# Patient Record
Sex: MALE | Race: WHITE | Employment: FULL TIME | URBAN - METROPOLITAN AREA
[De-identification: names, ages, dates, MRNs, and addresses within clinical notes are randomized per-mention and may not be internally consistent; named-entity substitution may affect disease eponyms.]

---

## 2020-11-13 ENCOUNTER — HOSPITAL ENCOUNTER (OUTPATIENT)
Age: 35
Discharge: HOME | End: 2020-11-13
Payer: COMMERCIAL

## 2020-11-13 VITALS
OXYGEN SATURATION: 97 % | TEMPERATURE: 100 F | HEART RATE: 74 BPM | SYSTOLIC BLOOD PRESSURE: 168 MMHG | RESPIRATION RATE: 18 BRPM | DIASTOLIC BLOOD PRESSURE: 98 MMHG

## 2020-11-13 DIAGNOSIS — Z20.822 SUSPECTED COVID-19 VIRUS INFECTION: Primary | ICD-10-CM

## 2020-11-13 PROCEDURE — 99203 OFFICE O/P NEW LOW 30 MIN: CPT

## 2020-11-13 NOTE — ED PROVIDER NOTES
Patient Seen in: [facility]      History   Patient presents with:  Cough/URI    Stated Complaint: cough    HPI    25-year-old male here for evaluation of URI symptoms, cough.   Patient reports he has been experiencing a dry cough, low-grade fe present. Mental Status: He is alert and oriented to person, place, and time.    Psychiatric:         Mood and Affect: Mood normal.         Behavior: Behavior normal.              ED Course     Labs Reviewed   SARS-COV-2 RNA,QUAL RT-PCR ([employer])

## 2020-11-13 NOTE — ED INITIAL ASSESSMENT (HPI)
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 PATIENT WITH + COUGH.  + LOW GRADE FEVER.

## 2021-01-08 ENCOUNTER — OFFICE VISIT (OUTPATIENT)
Dept: INTERNAL MEDICINE CLINIC | Facility: CLINIC | Age: 36
End: 2021-01-08
Payer: COMMERCIAL

## 2021-01-08 VITALS
TEMPERATURE: 98 F | SYSTOLIC BLOOD PRESSURE: 162 MMHG | BODY MASS INDEX: 34.51 KG/M2 | HEART RATE: 86 BPM | DIASTOLIC BLOOD PRESSURE: 101 MMHG | HEIGHT: 69 IN | WEIGHT: 233 LBS

## 2021-01-08 DIAGNOSIS — Z00.00 ANNUAL PHYSICAL EXAM: Primary | ICD-10-CM

## 2021-01-08 DIAGNOSIS — Z23 NEED FOR 23-POLYVALENT PNEUMOCOCCAL POLYSACCHARIDE VACCINE: ICD-10-CM

## 2021-01-08 DIAGNOSIS — I10 HYPERTENSION GOAL BP (BLOOD PRESSURE) < 130/80: ICD-10-CM

## 2021-01-08 DIAGNOSIS — F17.200 SMOKING: ICD-10-CM

## 2021-01-08 PROBLEM — IMO0001 SMOKING: Status: ACTIVE | Noted: 2021-01-08

## 2021-01-08 PROCEDURE — 99385 PREV VISIT NEW AGE 18-39: CPT | Performed by: INTERNAL MEDICINE

## 2021-01-08 PROCEDURE — 3008F BODY MASS INDEX DOCD: CPT | Performed by: INTERNAL MEDICINE

## 2021-01-08 PROCEDURE — 3080F DIAST BP >= 90 MM HG: CPT | Performed by: INTERNAL MEDICINE

## 2021-01-08 PROCEDURE — 90732 PPSV23 VACC 2 YRS+ SUBQ/IM: CPT | Performed by: INTERNAL MEDICINE

## 2021-01-08 PROCEDURE — 3077F SYST BP >= 140 MM HG: CPT | Performed by: INTERNAL MEDICINE

## 2021-01-08 PROCEDURE — 90471 IMMUNIZATION ADMIN: CPT | Performed by: INTERNAL MEDICINE

## 2021-01-08 RX ORDER — AMLODIPINE BESYLATE 5 MG/1
5 TABLET ORAL NIGHTLY
Qty: 90 TABLET | Refills: 1 | Status: SHIPPED | OUTPATIENT
Start: 2021-01-08 | End: 2021-04-02

## 2021-01-08 NOTE — PROGRESS NOTES
History of Present Illness   Patient ID: Stella Winter III is a 38year old male.   Chief Complaint: Physical (new pcp)      Stella Winter III is a pleasant 38year old male who presents for annual physical exam. Stella Winter Abdominal: Soft. Normal appearance and bowel sounds are normal. There is no abdominal tenderness. Musculoskeletal: Normal range of motion. Neurological: He is alert and oriented to person, place, and time. He has normal strength.    Skin: Skin is warm and - TSH W REFLEX TO FREE T4; Future  - COMP METABOLIC PANEL (14); Future  - HEMOGLOBIN A1C; Future  - LIPID PANEL; Future  - MICROALB/CREAT RATIO, RANDOM URINE; Future  - EKG 12-LEAD; Future  Plan  Doing well today. Screen labs above.   Encouraged increased Patient understands and agrees to follow directions and advice. ----------------------------------------- PATIENT INSTRUCTIONS-----------------------------------------     Patient Instructions       1.  Please purchase a blood pressure monitor, if you High blood pressure can harm your health  In a healthy blood vessel, the blood moves smoothly through the vessel and puts normal pressure on the vessel walls. High blood pressure occurs when blood pushes too hard against artery walls.  This causes dam An example of a blood pressure measurement is 120/70 (120 over 70). The top number is the pressure of blood against the artery walls during a heartbeat (systolic).  The bottom number is the pressure of blood against artery walls between heartbeats (diastoli · Limit alcohol. Drinking too much alcohol can raise blood pressure. Men should have no more than 2 drinks a day. Women should have no more than 1. (A drink is equal to 1 beer, or a small glass of wine, or a shot of liquor.)  · Control stress.  Stress makes Blood pressure measurements are given as 2 numbers. Systolic blood pressure is the upper number. This is the pressure when the heart contracts. Diastolic blood pressure is the lower number. This is the pressure when the heart relaxes between beats.  You sol · Start an exercise program. Talk with your healthcare provider about what exercise program is best for you. It doesn’t have to be difficult. Even brisk walking for 20 minutes 3 times a week is a good form of exercise.   · Avoid medicines that stimulates th · Relax for 5 minutes before taking the measurement. · Sit correctly. Be sure your back is supported. Don't sit on a couch or soft chair. Uncross your feet and place them flat on the floor.  Place your arm on a solid, flat surface like a table with the upp · Dizziness or dizziness with spinning sensation (vertigo)  · Weakness in an arm or leg or on one side of the face  · Trouble speaking or seeing   Controlling High Blood Pressure  High blood pressure (hypertension) is often called the silent killer.  This i · When you go to a restaurant, ask that your meal be prepared with no added salt. Maintain a healthy weight  · Ask your healthcare provider how many calories to eat a day. Then stick to that number.   · Ask your healthcare provider what weight range is h Eating salt (sodium) can make your body retain too much water. Excess water makes your heart work harder. Canned, packaged, and frozen foods are easy to prepare. But they are often high in sodium.  Here are some ideas for low-salt foods you can easily make ? Take 1-2 tablets of naproxen, which is the same as Aleve, twice daily with food for 5-7 days to reduce inflammation and pain. Do not use these medications if you have a kidney condition.  You may take an over the counter proton-pump-inhibitor such as Nexi · It can spread or radiate upwards, to the front, or go down your arms or legs (sciatica). · It can cause muscle spasm. Most of the time, mechanical problems with the muscles or spine cause the pain.  Mechanical problems are usually caused by an injury to · Don't sit for long periods, as in a long car ride or during other travel. This puts more stress on the lower back than standing or walking.   · During the first 24 to 72 hours after an acute injury or flare up of chronic back pain, apply an ice pack to th A radiologist will review any X-rays that were taken. Your provide will notify you of any new findings that may affect your care.   Call 911  Call 911 if any of the following occur:  · Trouble breathing  · Confusion  · Very drowsy or trouble awakening  · Fa · Over-the-counter medicine can help control pain and swelling. Try aspirin or a non-steroidal anti-inflammatory medicine (NSAID) such as ibuprofen.   Exercise  Exercise can help your back heal. It also helps your back get stronger and more flexible, preven · Wrap an ice pack or a bag of frozen peas in a thin towel. Never put ice directly on your skin. · Place the ice where your back hurts the most.  · Don’t ice for more than 20 minutes at a time. · You can use ice several times a day.   Medicines  Over-the- You don't always need X-rays for the first assessment of back pain, unless you had a physical injury such as from a car accident or fall. If your pain continues and doesn't respond to medical treatment, X-rays and other tests may then be done.    Home care · Relax and repeat the exercise with your right knee. · Do 2 to 3 of these exercises for each leg. · Repeat, hugging both knees to your chest at the same time. · Don't bounce, but use a gentle pull.   Medicines  Talk with your doctor before using medicin © 0465-3909 [employer] [address]. [address] [facility] [place] [facility] [place]. All rights reserved. This information is not intended as a substitute for professional medical care. Always follow your healthcare professional's instructions.

## 2021-01-08 NOTE — PATIENT INSTRUCTIONS
1. Please purchase a blood pressure monitor, if you don't already own one, and record your blood pressure and heart rate 1-2 times daily on the provided log.  The more values you provide at the end of the month the easier it will be to adjust your medic High blood pressure occurs when blood pushes too hard against artery walls. This causes damage to the artery walls and then the formation of scar tissue as it heals. This makes the arteries stiff and weak.  Plaque sticks to the scarred tissue narrowing and An example of a blood pressure measurement is 120/70 (120 over 70). The top number is the pressure of blood against the artery walls during a heartbeat (systolic).  The bottom number is the pressure of blood against artery walls between heartbeats (diastoli · Limit alcohol. Drinking too much alcohol can raise blood pressure. Men should have no more than 2 drinks a day. Women should have no more than 1. (A drink is equal to 1 beer, or a small glass of wine, or a shot of liquor.)  · Control stress.  Stress makes Blood pressure measurements are given as 2 numbers. Systolic blood pressure is the upper number. This is the pressure when the heart contracts. Diastolic blood pressure is the lower number. This is the pressure when the heart relaxes between beats.  You sol · Start an exercise program. Talk with your healthcare provider about what exercise program is best for you. It doesn’t have to be difficult. Even brisk walking for 20 minutes 3 times a week is a good form of exercise.   · Avoid medicines that stimulates th · Relax for 5 minutes before taking the measurement. · Sit correctly. Be sure your back is supported. Don't sit on a couch or soft chair. Uncross your feet and place them flat on the floor.  Place your arm on a solid, flat surface like a table with the upp · Dizziness or dizziness with spinning sensation (vertigo)  · Weakness in an arm or leg or on one side of the face  · Trouble speaking or seeing   Controlling High Blood Pressure  High blood pressure (hypertension) is often called the silent killer.  This i · When you go to a restaurant, ask that your meal be prepared with no added salt. Maintain a healthy weight  · Ask your healthcare provider how many calories to eat a day. Then stick to that number.   · Ask your healthcare provider what weight range is h Eating salt (sodium) can make your body retain too much water. Excess water makes your heart work harder. Canned, packaged, and frozen foods are easy to prepare. But they are often high in sodium.  Here are some ideas for low-salt foods you can easily make ? Take 1-2 tablets of naproxen, which is the same as Aleve, twice daily with food for 5-7 days to reduce inflammation and pain. Do not use these medications if you have a kidney condition.  You may take an over the counter proton-pump-inhibitor such as Nexi · It can spread or radiate upwards, to the front, or go down your arms or legs (sciatica). · It can cause muscle spasm. Most of the time, mechanical problems with the muscles or spine cause the pain.  Mechanical problems are usually caused by an injury to · Don't sit for long periods, as in a long car ride or during other travel. This puts more stress on the lower back than standing or walking.   · During the first 24 to 72 hours after an acute injury or flare up of chronic back pain, apply an ice pack to th A radiologist will review any X-rays that were taken. Your provide will notify you of any new findings that may affect your care.   Call 911  Call 911 if any of the following occur:  · Trouble breathing  · Confusion  · Very drowsy or trouble awakening  · Fa · Over-the-counter medicine can help control pain and swelling. Try aspirin or a non-steroidal anti-inflammatory medicine (NSAID) such as ibuprofen.   Exercise  Exercise can help your back heal. It also helps your back get stronger and more flexible, preven · Wrap an ice pack or a bag of frozen peas in a thin towel. Never put ice directly on your skin. · Place the ice where your back hurts the most.  · Don’t ice for more than 20 minutes at a time. · You can use ice several times a day.   Medicines  Over-the- You don't always need X-rays for the first assessment of back pain, unless you had a physical injury such as from a car accident or fall. If your pain continues and doesn't respond to medical treatment, X-rays and other tests may then be done.    Home care · Relax and repeat the exercise with your right knee. · Do 2 to 3 of these exercises for each leg. · Repeat, hugging both knees to your chest at the same time. · Don't bounce, but use a gentle pull.   Medicines  Talk with your doctor before using medicin © 3173-4770 [address]. [address] [facility] [place], [facility] [place]. All rights reserved. This information is not intended as a substitute for professional medical care. Always follow your healthcare professional's instructions.

## 2021-01-11 ENCOUNTER — EKG ENCOUNTER (OUTPATIENT)
Dept: LAB | Age: 36
End: 2021-01-11
Attending: INTERNAL MEDICINE
Payer: COMMERCIAL

## 2021-01-11 ENCOUNTER — LAB ENCOUNTER (OUTPATIENT)
Dept: LAB | Age: 36
End: 2021-01-11
Attending: INTERNAL MEDICINE
Payer: COMMERCIAL

## 2021-01-11 DIAGNOSIS — I10 HYPERTENSION GOAL BP (BLOOD PRESSURE) < 130/80: ICD-10-CM

## 2021-01-11 DIAGNOSIS — Z00.00 ANNUAL PHYSICAL EXAM: ICD-10-CM

## 2021-01-11 LAB
ALBUMIN SERPL-MCNC: 3.9 G/DL (ref 3.4–5)
ALBUMIN/GLOB SERPL: 1.1 {RATIO} (ref 1–2)
ALP LIVER SERPL-CCNC: 81 U/L
ALT SERPL-CCNC: 41 U/L
ANION GAP SERPL CALC-SCNC: 2 MMOL/L (ref 0–18)
AST SERPL-CCNC: 23 U/L (ref 15–37)
BILIRUB SERPL-MCNC: 0.5 MG/DL (ref 0.1–2)
BUN BLD-MCNC: 13 MG/DL (ref 7–18)
BUN/CREAT SERPL: 15.1 (ref 10–20)
CALCIUM BLD-MCNC: 9.5 MG/DL (ref 8.5–10.1)
CHLORIDE SERPL-SCNC: 107 MMOL/L (ref 98–112)
CHOLEST SMN-MCNC: 193 MG/DL (ref ?–200)
CO2 SERPL-SCNC: 29 MMOL/L (ref 21–32)
CREAT BLD-MCNC: 0.86 MG/DL
CREAT UR-SCNC: 245 MG/DL
DEPRECATED RDW RBC AUTO: 41.9 FL (ref 35.1–46.3)
ERYTHROCYTE [DISTWIDTH] IN BLOOD BY AUTOMATED COUNT: 12 % (ref 11–15)
EST. AVERAGE GLUCOSE BLD GHB EST-MCNC: 103 MG/DL (ref 68–126)
GLOBULIN PLAS-MCNC: 3.5 G/DL (ref 2.8–4.4)
GLUCOSE BLD-MCNC: 97 MG/DL (ref 70–99)
HBA1C MFR BLD HPLC: 5.2 % (ref ?–5.7)
HCT VFR BLD AUTO: 47.1 %
HDLC SERPL-MCNC: 51 MG/DL (ref 40–59)
HGB BLD-MCNC: 15.4 G/DL
LDLC SERPL CALC-MCNC: 111 MG/DL (ref ?–100)
M PROTEIN MFR SERPL ELPH: 7.4 G/DL (ref 6.4–8.2)
MCH RBC QN AUTO: 31 PG (ref 26–34)
MCHC RBC AUTO-ENTMCNC: 32.7 G/DL (ref 31–37)
MCV RBC AUTO: 95 FL
MICROALBUMIN UR-MCNC: 0.86 MG/DL
MICROALBUMIN/CREAT 24H UR-RTO: 3.5 UG/MG (ref ?–30)
NONHDLC SERPL-MCNC: 142 MG/DL (ref ?–130)
OSMOLALITY SERPL CALC.SUM OF ELEC: 286 MOSM/KG (ref 275–295)
PATIENT FASTING Y/N/NP: YES
PATIENT FASTING Y/N/NP: YES
PLATELET # BLD AUTO: 323 10(3)UL (ref 150–450)
POTASSIUM SERPL-SCNC: 4.2 MMOL/L (ref 3.5–5.1)
RBC # BLD AUTO: 4.96 X10(6)UL
SODIUM SERPL-SCNC: 138 MMOL/L (ref 136–145)
TRIGL SERPL-MCNC: 153 MG/DL (ref 30–149)
TSI SER-ACNC: 1.33 MIU/ML (ref 0.36–3.74)
VLDLC SERPL CALC-MCNC: 31 MG/DL (ref 0–30)
WBC # BLD AUTO: 7.9 X10(3) UL (ref 4–11)

## 2021-01-11 PROCEDURE — 80061 LIPID PANEL: CPT

## 2021-01-11 PROCEDURE — 82570 ASSAY OF URINE CREATININE: CPT

## 2021-01-11 PROCEDURE — 80053 COMPREHEN METABOLIC PANEL: CPT

## 2021-01-11 PROCEDURE — 83036 HEMOGLOBIN GLYCOSYLATED A1C: CPT

## 2021-01-11 PROCEDURE — 85027 COMPLETE CBC AUTOMATED: CPT

## 2021-01-11 PROCEDURE — 93005 ELECTROCARDIOGRAM TRACING: CPT

## 2021-01-11 PROCEDURE — 36415 COLL VENOUS BLD VENIPUNCTURE: CPT

## 2021-01-11 PROCEDURE — 82043 UR ALBUMIN QUANTITATIVE: CPT

## 2021-01-11 PROCEDURE — 84443 ASSAY THYROID STIM HORMONE: CPT

## 2021-01-11 PROCEDURE — 93010 ELECTROCARDIOGRAM REPORT: CPT | Performed by: INTERNAL MEDICINE

## 2021-01-27 ENCOUNTER — TELEMEDICINE (OUTPATIENT)
Dept: INTERNAL MEDICINE CLINIC | Facility: CLINIC | Age: 36
End: 2021-01-27
Payer: COMMERCIAL

## 2021-01-27 VITALS — HEART RATE: 101 BPM | DIASTOLIC BLOOD PRESSURE: 111 MMHG | SYSTOLIC BLOOD PRESSURE: 162 MMHG

## 2021-01-27 DIAGNOSIS — I10 HYPERTENSION GOAL BP (BLOOD PRESSURE) < 130/80: Primary | ICD-10-CM

## 2021-01-27 PROCEDURE — 3080F DIAST BP >= 90 MM HG: CPT | Performed by: INTERNAL MEDICINE

## 2021-01-27 PROCEDURE — 3077F SYST BP >= 140 MM HG: CPT | Performed by: INTERNAL MEDICINE

## 2021-01-27 PROCEDURE — 99214 OFFICE O/P EST MOD 30 MIN: CPT | Performed by: INTERNAL MEDICINE

## 2021-01-27 RX ORDER — TELMISARTAN 40 MG/1
40 TABLET ORAL NIGHTLY
Qty: 90 TABLET | Refills: 1 | Status: SHIPPED | OUTPATIENT
Start: 2021-01-27 | End: 2021-03-01

## 2021-01-27 NOTE — PROGRESS NOTES
Telehealth outside of [address] Verbal Consent     I conducted a telehealth visit with Alejandra Tay III today, 01/27/21, which was completed using two-way, real-time interactive audio and video communication.  This has been done in good This is a chronic problem. The current episode started 1 to 4 weeks ago. The problem has been waxing and waning since onset. The problem is uncontrolled. Pertinent negatives include no headaches, peripheral edema or shortness of breath.  Risk factors for co Uncontrolled, continue amlodipine 5mg, given BP>160/90 will start second agent per guideline with telmisartan 40mg and titrate to goal above. If not covered by insurance asked to check with pharmacy for alternatives. Monitor BP daily or ideally BID.  Strong Smoking       Reviewed Social History:  Social History    Tobacco Use      Smoking status: Current Every Day Smoker        Packs/day: 1.00        Years: 20.00        Pack years: 20      Smokeless tobacco: Never Used    Alcohol use: Never      Frequency:

## 2021-03-01 ENCOUNTER — TELEMEDICINE (OUTPATIENT)
Dept: INTERNAL MEDICINE CLINIC | Facility: CLINIC | Age: 36
End: 2021-03-01
Payer: COMMERCIAL

## 2021-03-01 VITALS — SYSTOLIC BLOOD PRESSURE: 145 MMHG | DIASTOLIC BLOOD PRESSURE: 93 MMHG

## 2021-03-01 DIAGNOSIS — I10 HYPERTENSION GOAL BP (BLOOD PRESSURE) < 130/80: Primary | ICD-10-CM

## 2021-03-01 PROCEDURE — 99214 OFFICE O/P EST MOD 30 MIN: CPT | Performed by: INTERNAL MEDICINE

## 2021-03-01 PROCEDURE — 3077F SYST BP >= 140 MM HG: CPT | Performed by: INTERNAL MEDICINE

## 2021-03-01 PROCEDURE — 3080F DIAST BP >= 90 MM HG: CPT | Performed by: INTERNAL MEDICINE

## 2021-03-01 RX ORDER — TELMISARTAN 80 MG/1
80 TABLET ORAL NIGHTLY
Qty: 90 TABLET | Refills: 1 | Status: SHIPPED | OUTPATIENT
Start: 2021-03-01 | End: 2021-05-07

## 2021-03-01 NOTE — PROGRESS NOTES
Telehealth outside of [address] Verbal Consent     I conducted a telehealth visit with Kayleigh Mccurdy III today, 03/01/21, which was completed using two-way, real-time interactive audio and video communication.  This has been done in good This is a chronic problem. The current episode started more than 1 month ago. The problem has been gradually improving since onset. The problem is uncontrolled. Pertinent negatives include no blurred vision, headaches, palpitations or peripheral edema.  Pas Follow Up: Return in about 1 month (around 4/2/2021) for VIRTUAL VISIT FOLLOW UP- htn- 220pm..       Labs & Imaging  Pertinent labs and imaging reviewed.    Lab Results   Component Value Date    GLU 97 01/11/2021    BUN 13 01/11/2021    BUNCREA 15.1 01/11/2 • telmisartan 80 MG Oral Tab Take 1 tablet (80 mg total) by mouth nightly. FOR BLOOD PRESSURE 90 tablet 1   • amLODIPine Besylate 5 MG Oral Tab Take 1 tablet (5 mg total) by mouth nightly. FOR BLOOD PRESSURE.  90 tablet 1            Patient advised to call The circulatory system is made up of the heart and blood vessels that carry blood through the body. Your heart is the pump for this system. With each heartbeat (contraction), the heart sends blood out through large blood vessels called arteries.  Blood pres · Stage 1 high blood pressure is systolic is 252 to 643 or diastolic between 80 to 89  · Stage 2 high blood pressure is when systolic is 757 or higher or the diastolic is 90 or higher  High blood pressure is diagnosed when multiple, separate readings show · Maintain a healthy weight. Being overweight makes you more likely to have high blood pressure. Losing excess weight helps lower blood pressure. · Exercise regularly. Daily exercise helps your heart and blood vessels work better and stay healthier.  It ca Your blood pressure can also rise if you are emotionally upset or in intense pain. It may go back to normal after a period of rest.  Blood pressure measurements are given as 2 numbers. Systolic blood pressure is the upper number.  This is the pressure when ? Use only small amounts of salt when cooking. · Start an exercise program. Talk with your healthcare provider about what exercise program is best for you. It doesn’t have to be difficult.  Even brisk walking for 20 minutes 3 times a week is a good form of · Don't smoke or drink coffee for 30 minutes  · Go to the bathroom before the test.  · Relax for 5 minutes before taking the measurement. · Sit correctly. Be sure your back is supported. Don't sit on a couch or soft chair.  Uncross your feet and place them · Throbbing or rushing sound in the ears  · Nosebleed  · Extreme drowsiness, confusion, or fainting  · Dizziness or dizziness with spinning sensation (vertigo)  · Weakness in an arm or leg or on one side of the face  · Trouble speaking or seeing   Controll · Ask your doctor about the DASH eating plan. This plan helps reduce blood pressure. · When you go to a restaurant, ask that your meal be prepared with no added salt.     Maintain a healthy weight  · Ask your healthcare provider how many calories to eat a Eating salt (sodium) can make your body retain too much water. Excess water makes your heart work harder. Canned, packaged, and frozen foods are easy to prepare. But they are often high in sodium.  Here are some ideas for low-salt foods you can easily make

## 2021-03-01 NOTE — PATIENT INSTRUCTIONS
1. Please purchase a blood pressure monitor, if you don't already own one, and record your blood pressure and heart rate 1-2 times daily on the provided log.  The more values you provide at the end of the month the easier it will be to adjust your medic High blood pressure occurs when blood pushes too hard against artery walls. This causes damage to the artery walls and then the formation of scar tissue as it heals. This makes the arteries stiff and weak.  Plaque sticks to the scarred tissue narrowing and An example of a blood pressure measurement is 120/70 (120 over 70). The top number is the pressure of blood against the artery walls during a heartbeat (systolic).  The bottom number is the pressure of blood against artery walls between heartbeats (diastoli · Limit alcohol. Drinking too much alcohol can raise blood pressure. Men should have no more than 2 drinks a day. Women should have no more than 1. (A drink is equal to 1 beer, or a small glass of wine, or a shot of liquor.)  · Control stress.  Stress makes Blood pressure measurements are given as 2 numbers. Systolic blood pressure is the upper number. This is the pressure when the heart contracts. Diastolic blood pressure is the lower number. This is the pressure when the heart relaxes between beats.  You sol · Start an exercise program. Talk with your healthcare provider about what exercise program is best for you. It doesn’t have to be difficult. Even brisk walking for 20 minutes 3 times a week is a good form of exercise.   · Avoid medicines that stimulates th · Relax for 5 minutes before taking the measurement. · Sit correctly. Be sure your back is supported. Don't sit on a couch or soft chair. Uncross your feet and place them flat on the floor.  Place your arm on a solid, flat surface like a table with the upp · Dizziness or dizziness with spinning sensation (vertigo)  · Weakness in an arm or leg or on one side of the face  · Trouble speaking or seeing   Controlling High Blood Pressure  High blood pressure (hypertension) is often called the silent killer.  This i · When you go to a restaurant, ask that your meal be prepared with no added salt. Maintain a healthy weight  · Ask your healthcare provider how many calories to eat a day. Then stick to that number.   · Ask your healthcare provider what weight range is h Eating salt (sodium) can make your body retain too much water. Excess water makes your heart work harder. Canned, packaged, and frozen foods are easy to prepare. But they are often high in sodium.  Here are some ideas for low-salt foods you can easily make

## 2021-04-02 NOTE — PROGRESS NOTES
Telehealth Visit        I spoke with Saima Tinoco III by secure video chat ([employer]), verified date of birth, and discussed their current concerns:     Reason for Visit:    HPI  Pleasant 78-year-old gentleman with a history of smokin alert and oriented to person, place, and time. Mental status is at baseline. Psychiatric:         Mood and Affect: Mood normal.         Thought Content:  Thought content normal.         Reviewed current medications:  Current Outpatient Medications   Medic symptoms worsen or Return in about 5 weeks (around 5/7/2021) for VIRTUAL VISIT FOLLOW UP 340pm anxity and htn. .. Labs & Imaging  Pertinent labs and imaging reviewed.    Lab Results   Component Value Date    GLU 97 01/11/2021    BUN 13 01/11/2021    BU advice. Telehealth Verbal Consent     I conducted a telehealth visit with Giovana Hargrove III today, 04/02/21, which was completed using two-way, real-time interactive audio and video communication.  This has been done in good michela to provide co

## 2021-04-07 DIAGNOSIS — F41.1 GENERALIZED ANXIETY DISORDER WITH PANIC ATTACKS: ICD-10-CM

## 2021-04-07 DIAGNOSIS — F41.0 GENERALIZED ANXIETY DISORDER WITH PANIC ATTACKS: ICD-10-CM

## 2021-04-09 RX ORDER — ESCITALOPRAM OXALATE 5 MG/1
TABLET ORAL
Qty: 90 TABLET | Refills: 0 | Status: SHIPPED | OUTPATIENT
Start: 2021-04-09 | End: 2021-05-07

## 2021-04-29 DIAGNOSIS — F41.0 GENERALIZED ANXIETY DISORDER WITH PANIC ATTACKS: ICD-10-CM

## 2021-04-29 DIAGNOSIS — F41.1 GENERALIZED ANXIETY DISORDER WITH PANIC ATTACKS: ICD-10-CM

## 2021-04-30 DIAGNOSIS — F41.1 GENERALIZED ANXIETY DISORDER WITH PANIC ATTACKS: ICD-10-CM

## 2021-04-30 DIAGNOSIS — F41.0 GENERALIZED ANXIETY DISORDER WITH PANIC ATTACKS: ICD-10-CM

## 2021-04-30 RX ORDER — ALPRAZOLAM 0.25 MG/1
0.25 TABLET ORAL 2 TIMES DAILY PRN
Qty: 20 TABLET | Refills: 0 | Status: SHIPPED | OUTPATIENT
Start: 2021-04-30 | End: 2021-06-15

## 2021-04-30 RX ORDER — ALPRAZOLAM 0.25 MG/1
0.25 TABLET ORAL 2 TIMES DAILY PRN
Qty: 20 TABLET | Refills: 0 | OUTPATIENT
Start: 2021-04-30

## 2021-05-01 ENCOUNTER — IMMUNIZATION (OUTPATIENT)
Dept: LAB | Facility: HOSPITAL | Age: 36
End: 2021-05-01
Attending: EMERGENCY MEDICINE
Payer: COMMERCIAL

## 2021-05-01 DIAGNOSIS — Z23 NEED FOR VACCINATION: Primary | ICD-10-CM

## 2021-05-01 PROCEDURE — 0011A SARSCOV2 VAC 100MCG/0.5ML IM: CPT

## 2021-05-07 ENCOUNTER — TELEMEDICINE (OUTPATIENT)
Dept: INTERNAL MEDICINE CLINIC | Facility: CLINIC | Age: 36
End: 2021-05-07
Payer: COMMERCIAL

## 2021-05-07 VITALS — HEART RATE: 95 BPM | DIASTOLIC BLOOD PRESSURE: 93 MMHG | SYSTOLIC BLOOD PRESSURE: 143 MMHG

## 2021-05-07 DIAGNOSIS — I10 HYPERTENSION GOAL BP (BLOOD PRESSURE) < 130/80: ICD-10-CM

## 2021-05-07 DIAGNOSIS — F41.0 GENERALIZED ANXIETY DISORDER WITH PANIC ATTACKS: ICD-10-CM

## 2021-05-07 DIAGNOSIS — F41.1 GENERALIZED ANXIETY DISORDER WITH PANIC ATTACKS: ICD-10-CM

## 2021-05-07 PROCEDURE — 3077F SYST BP >= 140 MM HG: CPT | Performed by: INTERNAL MEDICINE

## 2021-05-07 PROCEDURE — 99214 OFFICE O/P EST MOD 30 MIN: CPT | Performed by: INTERNAL MEDICINE

## 2021-05-07 PROCEDURE — 3080F DIAST BP >= 90 MM HG: CPT | Performed by: INTERNAL MEDICINE

## 2021-05-07 RX ORDER — CHLORTHALIDONE 25 MG/1
12.5 TABLET ORAL DAILY
Qty: 90 TABLET | Refills: 1 | Status: SHIPPED | OUTPATIENT
Start: 2021-05-07 | End: 2021-07-20

## 2021-05-07 RX ORDER — TELMISARTAN 80 MG/1
80 TABLET ORAL NIGHTLY
Qty: 90 TABLET | Refills: 1 | Status: SHIPPED | OUTPATIENT
Start: 2021-05-07 | End: 2021-06-15

## 2021-05-07 RX ORDER — AMLODIPINE BESYLATE 10 MG/1
10 TABLET ORAL DAILY
Qty: 90 TABLET | Refills: 1 | Status: SHIPPED | OUTPATIENT
Start: 2021-05-07 | End: 2021-06-15

## 2021-05-07 RX ORDER — ESCITALOPRAM OXALATE 10 MG/1
10 TABLET ORAL DAILY
Qty: 90 TABLET | Refills: 1 | Status: SHIPPED | OUTPATIENT
Start: 2021-05-07 | End: 2021-06-15

## 2021-05-07 NOTE — PROGRESS NOTES
Telehealth Visit        I spoke with Lisy Razo III by secure video chat ([employer]), verified date of birth, and discussed their current concerns:     Reason for Visit:    HPI     1.   His hypertension on telmisartan 80 mg nightly an Thought content normal.         Reviewed current medications:  Current Outpatient Medications   Medication Sig Dispense Refill   • Telmisartan 80 MG Oral Tab Take 1 tablet (80 mg total) by mouth nightly.  FOR BLOOD PRESSURE 90 tablet 1   • amLODIPine Besyla anxiety disorder with panic attacks      Hypertension goal BP (blood pressure) < 130/80      Smoking       Reviewed Social History:  Social History    Tobacco Use      Smoking status: Current Every Day Smoker        Packs/day: 1.00        Years: 20.00 Consent     I conducted a telehealth visit with Emery Lerma III today, 05/07/21, which was completed using two-way, real-time interactive audio and video communication.  This has been done in good michela to provide continuity of care in the best

## 2021-05-10 DIAGNOSIS — F41.0 GENERALIZED ANXIETY DISORDER WITH PANIC ATTACKS: ICD-10-CM

## 2021-05-10 DIAGNOSIS — F41.1 GENERALIZED ANXIETY DISORDER WITH PANIC ATTACKS: ICD-10-CM

## 2021-05-10 RX ORDER — ALPRAZOLAM 0.25 MG/1
0.25 TABLET ORAL 2 TIMES DAILY PRN
Qty: 20 TABLET | Refills: 0 | OUTPATIENT
Start: 2021-05-10

## 2021-05-11 DIAGNOSIS — F41.1 GENERALIZED ANXIETY DISORDER WITH PANIC ATTACKS: ICD-10-CM

## 2021-05-11 DIAGNOSIS — F41.0 GENERALIZED ANXIETY DISORDER WITH PANIC ATTACKS: ICD-10-CM

## 2021-05-11 RX ORDER — ALPRAZOLAM 0.25 MG/1
0.25 TABLET ORAL 2 TIMES DAILY PRN
Qty: 20 TABLET | Refills: 0 | OUTPATIENT
Start: 2021-05-11

## 2021-05-13 DIAGNOSIS — F41.0 GENERALIZED ANXIETY DISORDER WITH PANIC ATTACKS: ICD-10-CM

## 2021-05-13 DIAGNOSIS — F41.1 GENERALIZED ANXIETY DISORDER WITH PANIC ATTACKS: ICD-10-CM

## 2021-05-13 RX ORDER — ALPRAZOLAM 0.25 MG/1
0.25 TABLET ORAL 2 TIMES DAILY PRN
Qty: 20 TABLET | Refills: 0 | Status: CANCELLED | OUTPATIENT
Start: 2021-05-13

## 2021-05-14 NOTE — TELEPHONE ENCOUNTER
Refilled 2 weeks ago, if he needs extra he should be seen to discuss further management options- video or office is fine.

## 2021-06-10 ENCOUNTER — IMMUNIZATION (OUTPATIENT)
Dept: LAB | Facility: HOSPITAL | Age: 36
End: 2021-06-10
Attending: EMERGENCY MEDICINE
Payer: COMMERCIAL

## 2021-06-10 DIAGNOSIS — Z23 NEED FOR VACCINATION: Primary | ICD-10-CM

## 2021-06-10 PROCEDURE — 0012A SARSCOV2 VAC 100MCG/0.5ML IM: CPT

## 2021-06-15 ENCOUNTER — OFFICE VISIT (OUTPATIENT)
Dept: INTERNAL MEDICINE CLINIC | Facility: CLINIC | Age: 36
End: 2021-06-15
Payer: COMMERCIAL

## 2021-06-15 VITALS
HEIGHT: 69 IN | HEART RATE: 82 BPM | WEIGHT: 226.19 LBS | TEMPERATURE: 99 F | SYSTOLIC BLOOD PRESSURE: 118 MMHG | DIASTOLIC BLOOD PRESSURE: 73 MMHG | BODY MASS INDEX: 33.5 KG/M2

## 2021-06-15 DIAGNOSIS — F41.1 GENERALIZED ANXIETY DISORDER WITH PANIC ATTACKS: ICD-10-CM

## 2021-06-15 DIAGNOSIS — F41.0 GENERALIZED ANXIETY DISORDER WITH PANIC ATTACKS: ICD-10-CM

## 2021-06-15 DIAGNOSIS — I10 HYPERTENSION GOAL BP (BLOOD PRESSURE) < 130/80: Primary | ICD-10-CM

## 2021-06-15 PROCEDURE — 3078F DIAST BP <80 MM HG: CPT | Performed by: INTERNAL MEDICINE

## 2021-06-15 PROCEDURE — 3008F BODY MASS INDEX DOCD: CPT | Performed by: INTERNAL MEDICINE

## 2021-06-15 PROCEDURE — 3074F SYST BP LT 130 MM HG: CPT | Performed by: INTERNAL MEDICINE

## 2021-06-15 PROCEDURE — 99214 OFFICE O/P EST MOD 30 MIN: CPT | Performed by: INTERNAL MEDICINE

## 2021-06-15 RX ORDER — TELMISARTAN 80 MG/1
80 TABLET ORAL NIGHTLY
Qty: 90 TABLET | Refills: 1 | Status: SHIPPED | OUTPATIENT
Start: 2021-06-15 | End: 2021-07-20

## 2021-06-15 RX ORDER — ESCITALOPRAM OXALATE 20 MG/1
20 TABLET ORAL DAILY
Qty: 90 TABLET | Refills: 1 | Status: SHIPPED | OUTPATIENT
Start: 2021-06-15 | End: 2021-07-20

## 2021-06-15 RX ORDER — AMLODIPINE BESYLATE 10 MG/1
10 TABLET ORAL DAILY
Qty: 90 TABLET | Refills: 1 | Status: SHIPPED | OUTPATIENT
Start: 2021-06-15 | End: 2021-07-20

## 2021-06-15 RX ORDER — ALPRAZOLAM 0.25 MG/1
0.25 TABLET ORAL 2 TIMES DAILY PRN
Qty: 20 TABLET | Refills: 0 | Status: SHIPPED | OUTPATIENT
Start: 2021-06-15 | End: 2021-06-29

## 2021-06-15 NOTE — PATIENT INSTRUCTIONS
Your blood pressure is well controlled in the office but at home it has some room for improvement. 1.  Continue telmisartan 80 mg, this is a maximum dose. 2.  Continue amlodipine 10 mg, this is the maximum dose  3.   Continue chlorthalidone 12.5 mg daily

## 2021-06-15 NOTE — PROGRESS NOTES
History of Present Illness   Patient ID: Sandro Juarez III is a 38year old male. Chief Complaint: Hypertension and Anxiety      Hypertension  This is a chronic problem. The current episode started more than 1 year ago.  The problem has been grad not in acute distress. Appearance: Normal appearance. HENT:      Head: Normocephalic. Right Ear: External ear normal.      Left Ear: External ear normal.   Eyes:      Extraocular Movements: Extraocular movements intact.       Conjunctiva/sclera: for evaluation. Orders:  -     Escitalopram Oxalate; Take 1 tablet (20 mg total) by mouth daily. Dispense: 90 tablet; Refill: 1  -     ALPRAZolam; Take 1 tablet (0.25 mg total) by mouth 2 (two) times daily as needed for Anxiety. Dispense: 20 tablet;  Ref amlodipine 10            mg, chlorthalidone 12.5 mg, telmisartan 80 mg,            continue with current.       Smoking       Reviewed Social History:  Social History    Tobacco Use      Smoking status: Current Every Day Smoker        Packs/day: 1.00 chlorthalidone to a full 25 mg tablet, this is the maximum dose.   If your blood pressure over the next 2 weeks is consistently less then 130/80, such as what we are getting in the office of 118/73, continue with telmisartan 80mg, amlodipine 10 mg, and chlo

## 2021-06-15 NOTE — ASSESSMENT & PLAN NOTE
Continue current medications, discrepancy between blood pressure at home and in office, he will send me 2 weeks of ambulatory blood pressures and if greater than 130/80 we will increase chlorthalidone to a full strength, continue amlodipine 10 mg, telmisar

## 2021-06-29 DIAGNOSIS — F41.1 GENERALIZED ANXIETY DISORDER WITH PANIC ATTACKS: ICD-10-CM

## 2021-06-29 DIAGNOSIS — F41.0 GENERALIZED ANXIETY DISORDER WITH PANIC ATTACKS: ICD-10-CM

## 2021-06-29 RX ORDER — ALPRAZOLAM 0.25 MG/1
0.25 TABLET ORAL 2 TIMES DAILY PRN
Qty: 20 TABLET | Refills: 0 | Status: SHIPPED | OUTPATIENT
Start: 2021-06-29 | End: 2021-07-09

## 2021-07-09 DIAGNOSIS — F41.1 GENERALIZED ANXIETY DISORDER WITH PANIC ATTACKS: ICD-10-CM

## 2021-07-09 DIAGNOSIS — F41.0 GENERALIZED ANXIETY DISORDER WITH PANIC ATTACKS: ICD-10-CM

## 2021-07-09 RX ORDER — ALPRAZOLAM 0.25 MG/1
0.25 TABLET ORAL 2 TIMES DAILY PRN
Qty: 20 TABLET | Refills: 0 | Status: SHIPPED | OUTPATIENT
Start: 2021-07-09 | End: 2021-07-20

## 2021-07-20 ENCOUNTER — HOSPITAL ENCOUNTER (OUTPATIENT)
Dept: GENERAL RADIOLOGY | Age: 36
Discharge: HOME | End: 2021-07-20
Attending: INTERNAL MEDICINE
Payer: COMMERCIAL

## 2021-07-20 ENCOUNTER — OFFICE VISIT (OUTPATIENT)
Dept: INTERNAL MEDICINE CLINIC | Facility: CLINIC | Age: 36
End: 2021-07-20
Payer: COMMERCIAL

## 2021-07-20 ENCOUNTER — LAB ENCOUNTER (OUTPATIENT)
Dept: LAB | Age: 36
End: 2021-07-20
Attending: INTERNAL MEDICINE
Payer: COMMERCIAL

## 2021-07-20 VITALS
DIASTOLIC BLOOD PRESSURE: 75 MMHG | BODY MASS INDEX: 33.89 KG/M2 | WEIGHT: 228.81 LBS | TEMPERATURE: 97 F | SYSTOLIC BLOOD PRESSURE: 117 MMHG | HEIGHT: 69 IN | HEART RATE: 96 BPM

## 2021-07-20 DIAGNOSIS — I10 HYPERTENSION GOAL BP (BLOOD PRESSURE) < 130/80: ICD-10-CM

## 2021-07-20 DIAGNOSIS — J02.9 SORE THROAT: ICD-10-CM

## 2021-07-20 DIAGNOSIS — F41.1 GENERALIZED ANXIETY DISORDER WITH PANIC ATTACKS: ICD-10-CM

## 2021-07-20 DIAGNOSIS — F41.0 GENERALIZED ANXIETY DISORDER WITH PANIC ATTACKS: ICD-10-CM

## 2021-07-20 DIAGNOSIS — I10 HYPERTENSION GOAL BP (BLOOD PRESSURE) < 130/80: Primary | ICD-10-CM

## 2021-07-20 DIAGNOSIS — G89.29 CHRONIC BILATERAL LOW BACK PAIN WITHOUT SCIATICA: ICD-10-CM

## 2021-07-20 DIAGNOSIS — Z20.818 EXPOSURE TO STREP THROAT: ICD-10-CM

## 2021-07-20 DIAGNOSIS — M54.50 CHRONIC BILATERAL LOW BACK PAIN WITHOUT SCIATICA: ICD-10-CM

## 2021-07-20 LAB
ANION GAP SERPL CALC-SCNC: 7 MMOL/L (ref 0–18)
BUN BLD-MCNC: 17 MG/DL (ref 7–18)
BUN/CREAT SERPL: 19.1 (ref 10–20)
CALCIUM BLD-MCNC: 10 MG/DL (ref 8.5–10.1)
CHLORIDE SERPL-SCNC: 103 MMOL/L (ref 98–112)
CO2 SERPL-SCNC: 24 MMOL/L (ref 21–32)
CREAT BLD-MCNC: 0.89 MG/DL
GLUCOSE BLD-MCNC: 91 MG/DL (ref 70–99)
OSMOLALITY SERPL CALC.SUM OF ELEC: 279 MOSM/KG (ref 275–295)
PATIENT FASTING Y/N/NP: YES
POTASSIUM SERPL-SCNC: 3.9 MMOL/L (ref 3.5–5.1)
SODIUM SERPL-SCNC: 134 MMOL/L (ref 136–145)

## 2021-07-20 PROCEDURE — 80048 BASIC METABOLIC PNL TOTAL CA: CPT

## 2021-07-20 PROCEDURE — 36415 COLL VENOUS BLD VENIPUNCTURE: CPT

## 2021-07-20 PROCEDURE — 3008F BODY MASS INDEX DOCD: CPT | Performed by: INTERNAL MEDICINE

## 2021-07-20 PROCEDURE — 3078F DIAST BP <80 MM HG: CPT | Performed by: INTERNAL MEDICINE

## 2021-07-20 PROCEDURE — 3074F SYST BP LT 130 MM HG: CPT | Performed by: INTERNAL MEDICINE

## 2021-07-20 PROCEDURE — 99214 OFFICE O/P EST MOD 30 MIN: CPT | Performed by: INTERNAL MEDICINE

## 2021-07-20 RX ORDER — AMLODIPINE BESYLATE 10 MG/1
10 TABLET ORAL DAILY
Qty: 90 TABLET | Refills: 1 | Status: SHIPPED | OUTPATIENT
Start: 2021-07-20 | End: 2021-12-18

## 2021-07-20 RX ORDER — AMOXICILLIN AND CLAVULANATE POTASSIUM 875; 125 MG/1; MG/1
1 TABLET, FILM COATED ORAL 2 TIMES DAILY
Qty: 20 TABLET | Refills: 0 | Status: SHIPPED | OUTPATIENT
Start: 2021-07-20 | End: 2021-07-30

## 2021-07-20 RX ORDER — CHLORTHALIDONE 25 MG/1
25 TABLET ORAL DAILY
Qty: 90 TABLET | Refills: 1 | Status: SHIPPED | OUTPATIENT
Start: 2021-07-20 | End: 2021-12-18

## 2021-07-20 RX ORDER — CYCLOBENZAPRINE HCL 5 MG
TABLET ORAL
Qty: 30 TABLET | Refills: 0 | Status: SHIPPED | OUTPATIENT
Start: 2021-07-20 | End: 2021-08-12

## 2021-07-20 RX ORDER — ESCITALOPRAM OXALATE 20 MG/1
20 TABLET ORAL DAILY
Qty: 90 TABLET | Refills: 1 | Status: SHIPPED | OUTPATIENT
Start: 2021-07-20 | End: 2021-12-18

## 2021-07-20 RX ORDER — ALPRAZOLAM 0.5 MG/1
0.5 TABLET ORAL DAILY PRN
Qty: 30 TABLET | Refills: 0 | Status: SHIPPED | OUTPATIENT
Start: 2021-07-20 | End: 2021-08-12

## 2021-07-20 RX ORDER — TELMISARTAN 80 MG/1
80 TABLET ORAL NIGHTLY
Qty: 90 TABLET | Refills: 1 | Status: SHIPPED | OUTPATIENT
Start: 2021-07-20 | End: 2021-12-18

## 2021-07-20 NOTE — ASSESSMENT & PLAN NOTE
Chronic. Well-controlled on amlodipine, chlorthalidone, telmisartan, continue with current.   Check BMP today

## 2021-07-20 NOTE — ASSESSMENT & PLAN NOTE
Chronic. Significantly improved with Lexapro 20 mg, discussed reducing use of Xanax to daily but with stronger dosing as he is having improvement with higher dose, ultimately our goal is to have him off of Xanax or very rare usage, he is agreeable.

## 2021-07-20 NOTE — PROGRESS NOTES
History of Present Illness   Patient ID: Vannesa Alicea III is a 38year old male. Chief Complaint: Blood Pressure and Anxiety      Anxiety  This is a chronic problem. The current episode started more than 1 month ago. The problem occurs daily.  [facility]: Negative for numbness. Physical Exam     07/20/21  0825   BP: 117/75   Pulse: 96   Temp: 97 °F (36.1 °C)   TempSrc: Tympanic   Weight: 228 lb 12.8 oz (103.8 kg)   Height: 5' 9\" (1.753 m)     Body mass index is 33.79 kg/m².   BP Readi Dispense: 90 tablet; Refill: 1  -     Telmisartan; Take 1 tablet (80 mg total) by mouth nightly. FOR BLOOD PRESSURE  Dispense: 90 tablet; Refill: 1  -     Basic Metabolic Panel (8); Future; Expected date: 07/20/2021  2.  Generalized anxiety disorder with pa without sciatica      Generalized anxiety disorder with panic attacks            6/15/2021: Doing well on Lexapro, increased from            10 mg to 20 mg, follow-up 1 month for evaluation.       Hypertension goal BP (blood pressure) < 130/80            6/ advice. ----------------------------------------- PATIENT INSTRUCTIONS-----------------------------------------     Patient Instructions         Your symptoms are consistent with lower back pain and/ or muscle spasm.    This should resolve on it's own differently—not everyone is the same. · The pain can be sharp, stabbing, shooting, aching, cramping or burning. · Movement, standing, bending, lifting, sitting, or walking may worsen pain.   · It can be localized to one spot or area, or it can be more gen up towards your chest and a pillow between your knees. · At first, do not try to stretch out the sore spots. If there is a strain, it is not like the good soreness you get after exercising without an injury. In this case, stretching may make it worse.   · can cause drowsiness, affect your coordination, reflexes, and judgement. Do not drive or operate heavy machinery. Follow-up care  Follow up with your healthcare provider, or as advised. A radiologist will review any X-rays that were taken.  Your provide first few days, try heat for 15 minutes at a time to ease pain. Never sleep on a heating pad. · Over-the-counter medicine can help control pain and swelling. Try aspirin or a non-steroidal anti-inflammatory medicine (NSAID) such as ibuprofen.   Exercise  E pain and swelling. It helps most during the first 24 to 48 hours after an injury. · Wrap an ice pack or a bag of frozen peas in a thin towel. Never put ice directly on your skin.   · Place the ice where your back hurts the most.  · Don’t ice for more than the first assessment of back pain, unless you had a physical injury such as from a car accident or fall. If your pain continues and doesn't respond to medical treatment, X-rays and other tests may then be done.    Home care  · As soon as possible, start sit right knee. · Do 2 to 3 of these exercises for each leg. · Repeat, hugging both knees to your chest at the same time. · Don't bounce, but use a gentle pull.   Medicines  Talk with your doctor before using medicine, especially if you have other medical pr professional medical care. Always follow your healthcare professional's instructions.

## 2021-07-20 NOTE — PATIENT INSTRUCTIONS
Your symptoms are consistent with lower back pain and/ or muscle spasm.    This should resolve on it's own with time (can take 6-8 weeks) but below are some recommendations to speed your recovery:   • Take 1-2 tablets of naproxen, which is the same as lifting, sitting, or walking may worsen pain. · It can be localized to one spot or area, or it can be more generalized. · It can spread or radiate upwards, to the front, or go down your arms or legs (sciatica). · It can cause muscle spasm.   Most of the the good soreness you get after exercising without an injury. In this case, stretching may make it worse. · Don't sit for long periods, as in a long car ride or during other travel. This puts more stress on the lower back than standing or walking.   · [place] your healthcare provider, or as advised. A radiologist will review any X-rays that were taken. Your provide will notify you of any new findings that may affect your care.   Call 911  Call 911 if any of the following occur:  · Trouble breathing  · Confusio and swelling. Try aspirin or a non-steroidal anti-inflammatory medicine (NSAID) such as ibuprofen. Exercise  Exercise can help your back heal. It also helps your back get stronger and more flexible, preventing any reinjury.  Ask your healthcare provider ab Never put ice directly on your skin. · Place the ice where your back hurts the most.  · Don’t ice for more than 20 minutes at a time. · You can use ice several times a day.   Medicines  Over-the-counter pain relievers include acetaminophen and anti-inflam respond to medical treatment, X-rays and other tests may then be done. Home care  · As soon as possible, start sitting or walking again.  This will help prevent problems from a long bed rest. These problems include muscle weakness, worsening back stiffnes a gentle pull. Medicines  Talk with your doctor before using medicine, especially if you have other medical problems or are taking other medicines.   You may use over-the-counter medicines such as acetaminophen, ibuprofen, or naprosyn to control pain, unle

## 2021-07-21 DIAGNOSIS — F41.0 GENERALIZED ANXIETY DISORDER WITH PANIC ATTACKS: ICD-10-CM

## 2021-07-21 DIAGNOSIS — I10 HYPERTENSION GOAL BP (BLOOD PRESSURE) < 130/80: ICD-10-CM

## 2021-07-21 DIAGNOSIS — G89.29 CHRONIC BILATERAL LOW BACK PAIN WITHOUT SCIATICA: ICD-10-CM

## 2021-07-21 DIAGNOSIS — F41.1 GENERALIZED ANXIETY DISORDER WITH PANIC ATTACKS: ICD-10-CM

## 2021-07-21 DIAGNOSIS — Z20.818 EXPOSURE TO STREP THROAT: ICD-10-CM

## 2021-07-21 DIAGNOSIS — M54.50 CHRONIC BILATERAL LOW BACK PAIN WITHOUT SCIATICA: ICD-10-CM

## 2021-07-21 RX ORDER — AMOXICILLIN AND CLAVULANATE POTASSIUM 875; 125 MG/1; MG/1
1 TABLET, FILM COATED ORAL 2 TIMES DAILY
Qty: 20 TABLET | Refills: 0 | OUTPATIENT
Start: 2021-07-21 | End: 2021-07-31

## 2021-07-21 RX ORDER — ALPRAZOLAM 0.5 MG/1
0.5 TABLET ORAL DAILY PRN
Qty: 30 TABLET | Refills: 0 | OUTPATIENT
Start: 2021-07-21

## 2021-07-21 RX ORDER — CHLORTHALIDONE 25 MG/1
25 TABLET ORAL DAILY
Qty: 90 TABLET | Refills: 1 | OUTPATIENT
Start: 2021-07-21

## 2021-07-21 RX ORDER — CYCLOBENZAPRINE HCL 5 MG
TABLET ORAL
Qty: 30 TABLET | Refills: 0 | OUTPATIENT
Start: 2021-07-21

## 2021-07-22 ENCOUNTER — PATIENT MESSAGE (OUTPATIENT)
Dept: INTERNAL MEDICINE CLINIC | Facility: CLINIC | Age: 36
End: 2021-07-22

## 2021-07-22 NOTE — TELEPHONE ENCOUNTER
Spoke with pharmacy and verified they received all Rx's sent. Informed pt 4 Rx's are ready to be picked up and the rest are too soon to fill. He verbalized understanding and will . He did state that he has enough of the other pills currently.

## 2021-07-22 NOTE — TELEPHONE ENCOUNTER
From: Giovana Hargrove III  To: Nuzhat Pollock MD  Sent: 7/22/2021 7:33 AM CDT  Subject: Kash Bello, this is Neptali.  I called my local [facility] for the prescriptions you gave me for antibiotics, chlorthalidon and alprazolam bu

## 2021-08-05 DIAGNOSIS — F41.1 GENERALIZED ANXIETY DISORDER WITH PANIC ATTACKS: ICD-10-CM

## 2021-08-05 DIAGNOSIS — F41.0 GENERALIZED ANXIETY DISORDER WITH PANIC ATTACKS: ICD-10-CM

## 2021-08-05 NOTE — TELEPHONE ENCOUNTER
Please review; protocol failed/no protocol    Requested Prescriptions   Pending Prescriptions Disp Refills    ALPRAZolam (XANAX) 0.5 MG Oral Tab 30 tablet 0     Sig: Take 1 tablet (0.5 mg total) by mouth daily as needed for Anxiety.         There is no refill protocol information for this order           Recent Outpatient Visits              2 weeks ago Hypertension goal BP (blood pressure) < 130/80    [place] [id number], Mitch Correa, Arnoldo Shepard MD    Office Visit    1 month ago Hypertension goal BP (blood pressure) < 130/80    [place] [facility], Adecamille Correa, Sonja Cordero MD    Office Visit    3 months ago Hypertension goal BP (blood pressure) < 130/80    [address] Arnoldo Pantoja MD    Telemedicine    4 months ago Generalized anxiety disorder with panic attacks    [address] Arnoldo Pantoja MD    Telemedicine    5 months ago Hypertension goal BP (blood pressure) < 130/80    150 Sonja Pantoja MD    Telemedicine            Future Appointments         Provider Department Appt Notes    In 2 months Jorden Shepard MD [employer], [facility] fu routine bp/anxiety

## 2021-08-06 RX ORDER — ALPRAZOLAM 0.5 MG/1
0.5 TABLET ORAL DAILY PRN
Qty: 30 TABLET | Refills: 0 | OUTPATIENT
Start: 2021-08-06

## 2021-08-12 DIAGNOSIS — F41.0 GENERALIZED ANXIETY DISORDER WITH PANIC ATTACKS: ICD-10-CM

## 2021-08-12 DIAGNOSIS — M54.50 CHRONIC BILATERAL LOW BACK PAIN WITHOUT SCIATICA: ICD-10-CM

## 2021-08-12 DIAGNOSIS — F41.1 GENERALIZED ANXIETY DISORDER WITH PANIC ATTACKS: ICD-10-CM

## 2021-08-12 DIAGNOSIS — G89.29 CHRONIC BILATERAL LOW BACK PAIN WITHOUT SCIATICA: ICD-10-CM

## 2021-08-13 RX ORDER — CYCLOBENZAPRINE HCL 5 MG
TABLET ORAL
Qty: 30 TABLET | Refills: 0 | Status: SHIPPED | OUTPATIENT
Start: 2021-08-13 | End: 2021-09-14

## 2021-08-13 RX ORDER — ALPRAZOLAM 0.5 MG/1
0.5 TABLET ORAL DAILY PRN
Qty: 30 TABLET | Refills: 0 | Status: SHIPPED | OUTPATIENT
Start: 2021-08-13 | End: 2021-09-14

## 2021-08-18 DIAGNOSIS — F41.1 GENERALIZED ANXIETY DISORDER WITH PANIC ATTACKS: ICD-10-CM

## 2021-08-18 DIAGNOSIS — F41.0 GENERALIZED ANXIETY DISORDER WITH PANIC ATTACKS: ICD-10-CM

## 2021-08-18 RX ORDER — ALPRAZOLAM 0.5 MG/1
0.5 TABLET ORAL DAILY PRN
Qty: 30 TABLET | Refills: 0 | Status: CANCELLED | OUTPATIENT
Start: 2021-08-18

## 2021-08-18 NOTE — TELEPHONE ENCOUNTER
Patient requesting refill(s) too soon; should still have refills. Patient informed via MyChart response. ALPRAZolam (XANAX) 0.5 MG Oral Tab 30 tablet 0 8/13/2021     Sig - Route:  Take 1 tablet (0.5 mg total) by mouth daily as needed for Anxiety. - Oral

## 2021-09-13 DIAGNOSIS — M54.50 CHRONIC BILATERAL LOW BACK PAIN WITHOUT SCIATICA: ICD-10-CM

## 2021-09-13 DIAGNOSIS — G89.29 CHRONIC BILATERAL LOW BACK PAIN WITHOUT SCIATICA: ICD-10-CM

## 2021-09-13 DIAGNOSIS — F41.0 GENERALIZED ANXIETY DISORDER WITH PANIC ATTACKS: ICD-10-CM

## 2021-09-13 DIAGNOSIS — F41.1 GENERALIZED ANXIETY DISORDER WITH PANIC ATTACKS: ICD-10-CM

## 2021-09-14 RX ORDER — CYCLOBENZAPRINE HCL 5 MG
TABLET ORAL
Qty: 30 TABLET | Refills: 0 | Status: SHIPPED | OUTPATIENT
Start: 2021-09-14 | End: 2021-11-04

## 2021-09-14 RX ORDER — ALPRAZOLAM 0.5 MG/1
TABLET ORAL
Qty: 30 TABLET | Refills: 0 | Status: SHIPPED | OUTPATIENT
Start: 2021-09-14 | End: 2021-10-06

## 2021-10-05 DIAGNOSIS — F41.1 GENERALIZED ANXIETY DISORDER WITH PANIC ATTACKS: ICD-10-CM

## 2021-10-05 DIAGNOSIS — F41.0 GENERALIZED ANXIETY DISORDER WITH PANIC ATTACKS: ICD-10-CM

## 2021-10-06 RX ORDER — ALPRAZOLAM 0.5 MG/1
TABLET ORAL
Qty: 30 TABLET | Refills: 0 | Status: SHIPPED | OUTPATIENT
Start: 2021-10-06 | End: 2021-11-04

## 2021-11-04 DIAGNOSIS — F41.1 GENERALIZED ANXIETY DISORDER WITH PANIC ATTACKS: ICD-10-CM

## 2021-11-04 DIAGNOSIS — M54.50 CHRONIC BILATERAL LOW BACK PAIN WITHOUT SCIATICA: ICD-10-CM

## 2021-11-04 DIAGNOSIS — F41.0 GENERALIZED ANXIETY DISORDER WITH PANIC ATTACKS: ICD-10-CM

## 2021-11-04 DIAGNOSIS — G89.29 CHRONIC BILATERAL LOW BACK PAIN WITHOUT SCIATICA: ICD-10-CM

## 2021-11-04 NOTE — TELEPHONE ENCOUNTER
Please review; protocol failed/no protocol    Requested Prescriptions   Pending Prescriptions Disp Refills    cyclobenzaprine 5 MG Oral Tab 30 tablet 0     Sig: Take 0.5-1 tablets (2.5-5 mg total) by mouth every 8 (eight) hours as needed for Muscle spasms.

## 2021-11-05 RX ORDER — CYCLOBENZAPRINE HCL 5 MG
TABLET ORAL EVERY 8 HOURS PRN
Qty: 30 TABLET | Refills: 0 | Status: SHIPPED | OUTPATIENT
Start: 2021-11-05 | End: 2021-12-10

## 2021-11-05 RX ORDER — ALPRAZOLAM 0.5 MG/1
0.5 TABLET ORAL DAILY PRN
Qty: 30 TABLET | Refills: 0 | Status: SHIPPED | OUTPATIENT
Start: 2021-11-05 | End: 2021-12-07

## 2021-12-07 DIAGNOSIS — F41.1 GENERALIZED ANXIETY DISORDER WITH PANIC ATTACKS: ICD-10-CM

## 2021-12-07 DIAGNOSIS — F41.0 GENERALIZED ANXIETY DISORDER WITH PANIC ATTACKS: ICD-10-CM

## 2021-12-07 NOTE — TELEPHONE ENCOUNTER
Please review; protocol failed/no protocol    Requested Prescriptions   Pending Prescriptions Disp Refills    ALPRAZOLAM 0.5 MG Oral Tab [Pharmacy Med Name: ALPRAZOLAM 0.5 MG TABLET] 30 tablet 0     Sig: TAKE 1 TABLET BY MOUTH EVERY DAY AS NEEDED FOR Mariela Regan

## 2021-12-08 RX ORDER — ALPRAZOLAM 0.5 MG/1
0.5 TABLET ORAL DAILY PRN
Qty: 30 TABLET | Refills: 0 | Status: SHIPPED | OUTPATIENT
Start: 2021-12-08 | End: 2021-12-18

## 2021-12-10 ENCOUNTER — TELEPHONE (OUTPATIENT)
Dept: INTERNAL MEDICINE CLINIC | Facility: CLINIC | Age: 36
End: 2021-12-10

## 2021-12-10 DIAGNOSIS — M54.50 CHRONIC BILATERAL LOW BACK PAIN WITHOUT SCIATICA: ICD-10-CM

## 2021-12-10 DIAGNOSIS — G89.29 CHRONIC BILATERAL LOW BACK PAIN WITHOUT SCIATICA: ICD-10-CM

## 2021-12-10 RX ORDER — CYCLOBENZAPRINE HCL 5 MG
TABLET ORAL
Qty: 30 TABLET | Refills: 0 | Status: SHIPPED | OUTPATIENT
Start: 2021-12-10 | End: 2021-12-18

## 2021-12-10 NOTE — TELEPHONE ENCOUNTER
Dr. Vance Ingram, patient is requesting lab order to check for Sodium, previous test was low. I see a BMP active, would you like to add anything else?

## 2021-12-10 NOTE — TELEPHONE ENCOUNTER
Patient is calling to request blood lab order that includes sodium as last labs noted low sodium. Patient has a follow up appt on 12/18//21 with PCP. Please advise patient when order is confirmed.

## 2021-12-13 NOTE — TELEPHONE ENCOUNTER
Spoke with pt name and  verified,  Relayed message below pt will try to get labs done prior to appt.  lab call center [phone] given to pt if he wishes to schedule appt, advised they do accept walk ins as well

## 2021-12-14 ENCOUNTER — LAB ENCOUNTER (OUTPATIENT)
Dept: LAB | Age: 36
End: 2021-12-14
Attending: INTERNAL MEDICINE
Payer: COMMERCIAL

## 2021-12-14 DIAGNOSIS — E87.1 HYPONATREMIA: ICD-10-CM

## 2021-12-14 PROCEDURE — 36415 COLL VENOUS BLD VENIPUNCTURE: CPT

## 2021-12-14 PROCEDURE — 80048 BASIC METABOLIC PNL TOTAL CA: CPT

## 2021-12-18 ENCOUNTER — OFFICE VISIT (OUTPATIENT)
Dept: INTERNAL MEDICINE CLINIC | Facility: CLINIC | Age: 36
End: 2021-12-18
Payer: COMMERCIAL

## 2021-12-18 VITALS
BODY MASS INDEX: 35.55 KG/M2 | HEIGHT: 69 IN | SYSTOLIC BLOOD PRESSURE: 127 MMHG | WEIGHT: 240 LBS | DIASTOLIC BLOOD PRESSURE: 73 MMHG | HEART RATE: 96 BPM

## 2021-12-18 DIAGNOSIS — F41.0 GENERALIZED ANXIETY DISORDER WITH PANIC ATTACKS: ICD-10-CM

## 2021-12-18 DIAGNOSIS — G89.29 CHRONIC BILATERAL LOW BACK PAIN WITHOUT SCIATICA: ICD-10-CM

## 2021-12-18 DIAGNOSIS — F41.1 GENERALIZED ANXIETY DISORDER WITH PANIC ATTACKS: ICD-10-CM

## 2021-12-18 DIAGNOSIS — M54.50 CHRONIC BILATERAL LOW BACK PAIN WITHOUT SCIATICA: ICD-10-CM

## 2021-12-18 DIAGNOSIS — I10 HYPERTENSION GOAL BP (BLOOD PRESSURE) < 130/80: Primary | ICD-10-CM

## 2021-12-18 PROCEDURE — 3074F SYST BP LT 130 MM HG: CPT | Performed by: INTERNAL MEDICINE

## 2021-12-18 PROCEDURE — 3078F DIAST BP <80 MM HG: CPT | Performed by: INTERNAL MEDICINE

## 2021-12-18 PROCEDURE — 3008F BODY MASS INDEX DOCD: CPT | Performed by: INTERNAL MEDICINE

## 2021-12-18 PROCEDURE — 99214 OFFICE O/P EST MOD 30 MIN: CPT | Performed by: INTERNAL MEDICINE

## 2021-12-18 RX ORDER — TELMISARTAN 80 MG/1
80 TABLET ORAL NIGHTLY
Qty: 90 TABLET | Refills: 3 | Status: SHIPPED | OUTPATIENT
Start: 2021-12-18

## 2021-12-18 RX ORDER — CYCLOBENZAPRINE HCL 5 MG
TABLET ORAL
Qty: 90 TABLET | Refills: 0 | Status: SHIPPED | OUTPATIENT
Start: 2021-12-18

## 2021-12-18 RX ORDER — AMLODIPINE BESYLATE 10 MG/1
10 TABLET ORAL DAILY
Qty: 90 TABLET | Refills: 3 | Status: SHIPPED | OUTPATIENT
Start: 2021-12-18

## 2021-12-18 RX ORDER — ALPRAZOLAM 1 MG/1
1 TABLET ORAL DAILY PRN
Qty: 30 TABLET | Refills: 0 | Status: SHIPPED | OUTPATIENT
Start: 2021-12-18 | End: 2022-01-14

## 2021-12-18 RX ORDER — CHLORTHALIDONE 25 MG/1
25 TABLET ORAL DAILY
Qty: 90 TABLET | Refills: 3 | Status: SHIPPED | OUTPATIENT
Start: 2021-12-18

## 2021-12-18 RX ORDER — ESCITALOPRAM OXALATE 20 MG/1
20 TABLET ORAL DAILY
Qty: 90 TABLET | Refills: 3 | Status: SHIPPED | OUTPATIENT
Start: 2021-12-18

## 2021-12-18 NOTE — PROGRESS NOTES
History of Present Illness   Patient ID: Vannesa Alicea III is a 43year old male. Today's Date: 12/18/21  Chief Complaint: Hypertension (review labs)      Hypertension  This is a chronic problem.  The current episode started more than 1 month a vertigo and numbness. 12/18/21  1029   BP: 127/73   Pulse: 96   Weight: 240 lb (108.9 kg)   Height: 5' 9\" (1.753 m)     Body mass index is 35.44 kg/m².   BP Readings from Last 3 Encounters:  12/18/21 : 127/73  07/20/21 : 117/75  06/15/21 : 118/73 needed for muscle spasms. May cause sedation; no driving. Maximum dose: 10mg every 8 hours. 90 tablet 0       Assessment & Plan    1.  Hypertension goal BP (blood pressure) < 130/80 (Primary)  Overview:  6/15/2021: Significantly improved on amlodipine 10 mg 0  Plan  Chronic, well controlled on current medications, denies adverse effects, continue with present management. Follow Up: As needed/if symptoms worsen or Return for ANNUAL EXAM early 2022. .         Labs/imaging, medical/social history  [place]

## 2022-01-14 DIAGNOSIS — F41.0 GENERALIZED ANXIETY DISORDER WITH PANIC ATTACKS: ICD-10-CM

## 2022-01-14 DIAGNOSIS — F41.1 GENERALIZED ANXIETY DISORDER WITH PANIC ATTACKS: ICD-10-CM

## 2022-01-14 RX ORDER — ALPRAZOLAM 1 MG/1
1 TABLET ORAL DAILY PRN
Qty: 30 TABLET | Refills: 0 | Status: SHIPPED | OUTPATIENT
Start: 2022-01-14

## 2022-02-11 NOTE — TELEPHONE ENCOUNTER
Please review. Protocol failed or has no protocol. Requested Prescriptions   Pending Prescriptions Disp Refills    ALPRAZOLAM 1 MG Oral Tab [Pharmacy Med Name: ALPRAZOLAM 1 MG TABLET] 30 tablet 0     Sig: TAKE 1 TABLET (1 MG TOTAL) BY MOUTH DAILY AS NEEDED (PANIC ATTACK).         There is no refill protocol information for this order           Recent Outpatient Visits              1 month ago Hypertension goal BP (blood pressure) < 130/80    [address], Arnoldo Lin MD    Office Visit    6 months ago Hypertension goal BP (blood pressure) < 130/80    [address], Leighton Owen MD    Office Visit    8 months ago Hypertension goal BP (blood pressure) < 130/80    150 Leighton Pantoja MD    Office Visit    9 months ago Hypertension goal BP (blood pressure) < 130/80    150 Leighton Pantoja MD    Telemedicine    10 months ago Generalized anxiety disorder with panic attacks    150 Leighton Pantoja MD    Telemedicine            Future Appointments         Provider Department Appt Notes    In 2 months Handy Lin MD [address], [place] physical

## 2022-02-12 RX ORDER — ALPRAZOLAM 1 MG/1
1 TABLET ORAL DAILY PRN
Qty: 30 TABLET | Refills: 0 | Status: SHIPPED | OUTPATIENT
Start: 2022-02-12 | End: 2022-03-08

## 2022-03-08 RX ORDER — CYCLOBENZAPRINE HCL 5 MG
TABLET ORAL
Qty: 90 TABLET | Refills: 0 | Status: SHIPPED | OUTPATIENT
Start: 2022-03-08

## 2022-03-08 RX ORDER — AMLODIPINE BESYLATE 10 MG/1
10 TABLET ORAL DAILY
Qty: 90 TABLET | Refills: 3 | OUTPATIENT
Start: 2022-03-08

## 2022-03-08 RX ORDER — ALPRAZOLAM 1 MG/1
1 TABLET ORAL DAILY PRN
Qty: 30 TABLET | Refills: 0 | Status: SHIPPED | OUTPATIENT
Start: 2022-03-08

## 2022-03-08 NOTE — TELEPHONE ENCOUNTER
Please review Protocol Failed/No Protocol        Requested Prescriptions   Pending Prescriptions Disp Refills    amLODIPine 10 MG Oral Tab 90 tablet 3     Sig: Take 1 tablet (10 mg total) by mouth daily. FOR BLOOD PRESSURE. Hypertensive Medications Protocol Passed - 3/8/2022  7:26 AM        Passed - CMP or BMP in past 12 months        Passed - Appointment in past 6 or next 3 months        Passed - GFR Non- > 50     Lab Results   Component Value Date    GFRNAA 102 12/14/2021                    cyclobenzaprine 5 MG Oral Tab 90 tablet 0     Sig: Take 2.5mg - 5mg every 8 hours as needed for muscle spasms. May cause sedation; no driving. Maximum dose: 10mg every 8 hours. There is no refill protocol information for this order        ALPRAZolam 1 MG Oral Tab 30 tablet 0     Sig: Take 1 tablet (1 mg total) by mouth daily as needed (panic attack).         There is no refill protocol information for this order           Future Appointments         Provider Department Appt Notes    In 2 months Meryl Martinez MD [facility], [facility], [address], [place] physical            Recent Outpatient Visits              2 months ago Hypertension goal BP (blood pressure) < 130/80    Sree Castelan MD    Office Visit    7 months ago Hypertension goal BP (blood pressure) < 130/80    Sree Castelan MD    Office Visit    8 months ago Hypertension goal BP (blood pressure) < 130/80    Sree Castelan MD    Office Visit    10 months ago Hypertension goal BP (blood pressure) < 130/80    Sree Castelan MD    Telemedicine    11 months ago Generalized anxiety disorder with panic attacks    Sree Castelan MD    Telemedicine

## 2022-04-05 RX ORDER — ALPRAZOLAM 1 MG/1
1 TABLET ORAL DAILY PRN
Qty: 30 TABLET | Refills: 1 | Status: SHIPPED | OUTPATIENT
Start: 2022-04-05

## 2022-04-05 NOTE — TELEPHONE ENCOUNTER
Please review refill protocol failed/ no protocol  Requested Prescriptions   Pending Prescriptions Disp Refills    ALPRAZolam 1 MG Oral Tab 30 tablet 0     Sig: Take 1 tablet (1 mg total) by mouth daily as needed (panic attack).         There is no refill protocol information for this order

## 2022-05-09 DIAGNOSIS — F41.1 GENERALIZED ANXIETY DISORDER WITH PANIC ATTACKS: ICD-10-CM

## 2022-05-09 DIAGNOSIS — F41.0 GENERALIZED ANXIETY DISORDER WITH PANIC ATTACKS: ICD-10-CM

## 2022-05-09 RX ORDER — ALPRAZOLAM 1 MG/1
1 TABLET ORAL DAILY PRN
Qty: 30 TABLET | Refills: 1 | Status: CANCELLED | OUTPATIENT
Start: 2022-05-09

## 2022-05-10 ENCOUNTER — LAB ENCOUNTER (OUTPATIENT)
Dept: LAB | Age: 37
End: 2022-05-10
Attending: INTERNAL MEDICINE
Payer: COMMERCIAL

## 2022-05-10 ENCOUNTER — OFFICE VISIT (OUTPATIENT)
Dept: INTERNAL MEDICINE CLINIC | Facility: CLINIC | Age: 37
End: 2022-05-10
Payer: COMMERCIAL

## 2022-05-10 VITALS
HEIGHT: 69 IN | HEART RATE: 85 BPM | DIASTOLIC BLOOD PRESSURE: 75 MMHG | BODY MASS INDEX: 35.55 KG/M2 | SYSTOLIC BLOOD PRESSURE: 115 MMHG | WEIGHT: 240 LBS

## 2022-05-10 DIAGNOSIS — E55.9 VITAMIN D DEFICIENCY: ICD-10-CM

## 2022-05-10 DIAGNOSIS — J45.41 MODERATE PERSISTENT ASTHMA WITH ACUTE EXACERBATION: ICD-10-CM

## 2022-05-10 DIAGNOSIS — I10 HYPERTENSION GOAL BP (BLOOD PRESSURE) < 130/80: ICD-10-CM

## 2022-05-10 DIAGNOSIS — Z71.6 ENCOUNTER FOR SMOKING CESSATION COUNSELING: ICD-10-CM

## 2022-05-10 DIAGNOSIS — F41.0 GENERALIZED ANXIETY DISORDER WITH PANIC ATTACKS: ICD-10-CM

## 2022-05-10 DIAGNOSIS — Z00.00 ANNUAL PHYSICAL EXAM: Primary | ICD-10-CM

## 2022-05-10 DIAGNOSIS — Z00.00 ANNUAL PHYSICAL EXAM: ICD-10-CM

## 2022-05-10 DIAGNOSIS — F41.1 GENERALIZED ANXIETY DISORDER WITH PANIC ATTACKS: ICD-10-CM

## 2022-05-10 LAB
ALBUMIN SERPL-MCNC: 4.5 G/DL (ref 3.4–5)
ALBUMIN/GLOB SERPL: 1.1 {RATIO} (ref 1–2)
ALP LIVER SERPL-CCNC: 88 U/L
ALT SERPL-CCNC: 40 U/L
ANION GAP SERPL CALC-SCNC: 8 MMOL/L (ref 0–18)
AST SERPL-CCNC: 19 U/L (ref 15–37)
BILIRUB SERPL-MCNC: 0.7 MG/DL (ref 0.1–2)
BUN BLD-MCNC: 14 MG/DL (ref 7–18)
BUN/CREAT SERPL: 15.6 (ref 10–20)
CALCIUM BLD-MCNC: 10.3 MG/DL (ref 8.5–10.1)
CHLORIDE SERPL-SCNC: 101 MMOL/L (ref 98–112)
CHOLEST SERPL-MCNC: 194 MG/DL (ref ?–200)
CO2 SERPL-SCNC: 26 MMOL/L (ref 21–32)
CREAT BLD-MCNC: 0.9 MG/DL
DEPRECATED RDW RBC AUTO: 43.8 FL (ref 35.1–46.3)
ERYTHROCYTE [DISTWIDTH] IN BLOOD BY AUTOMATED COUNT: 12.6 % (ref 11–15)
EST. AVERAGE GLUCOSE BLD GHB EST-MCNC: 105 MG/DL (ref 68–126)
FASTING PATIENT LIPID ANSWER: YES
FASTING STATUS PATIENT QL REPORTED: YES
GLOBULIN PLAS-MCNC: 4 G/DL (ref 2.8–4.4)
GLUCOSE BLD-MCNC: 93 MG/DL (ref 70–99)
HBA1C MFR BLD: 5.3 % (ref ?–5.7)
HCT VFR BLD AUTO: 48.6 %
HDLC SERPL-MCNC: 46 MG/DL (ref 40–59)
HGB BLD-MCNC: 16.7 G/DL
LDLC SERPL CALC-MCNC: 121 MG/DL (ref ?–100)
MCH RBC QN AUTO: 32 PG (ref 26–34)
MCHC RBC AUTO-ENTMCNC: 34.4 G/DL (ref 31–37)
MCV RBC AUTO: 93.1 FL
NONHDLC SERPL-MCNC: 148 MG/DL (ref ?–130)
OSMOLALITY SERPL CALC.SUM OF ELEC: 280 MOSM/KG (ref 275–295)
PLATELET # BLD AUTO: 389 10(3)UL (ref 150–450)
POTASSIUM SERPL-SCNC: 3.6 MMOL/L (ref 3.5–5.1)
PROT SERPL-MCNC: 8.5 G/DL (ref 6.4–8.2)
RBC # BLD AUTO: 5.22 X10(6)UL
SODIUM SERPL-SCNC: 135 MMOL/L (ref 136–145)
TRIGL SERPL-MCNC: 150 MG/DL (ref 30–149)
TSI SER-ACNC: 1.27 MIU/ML (ref 0.36–3.74)
VIT D+METAB SERPL-MCNC: 19.6 NG/ML (ref 30–100)
VLDLC SERPL CALC-MCNC: 26 MG/DL (ref 0–30)
WBC # BLD AUTO: 10 X10(3) UL (ref 4–11)

## 2022-05-10 PROCEDURE — 3008F BODY MASS INDEX DOCD: CPT | Performed by: INTERNAL MEDICINE

## 2022-05-10 PROCEDURE — 36415 COLL VENOUS BLD VENIPUNCTURE: CPT

## 2022-05-10 PROCEDURE — 82306 VITAMIN D 25 HYDROXY: CPT

## 2022-05-10 PROCEDURE — 3078F DIAST BP <80 MM HG: CPT | Performed by: INTERNAL MEDICINE

## 2022-05-10 PROCEDURE — 80061 LIPID PANEL: CPT

## 2022-05-10 PROCEDURE — 99395 PREV VISIT EST AGE 18-39: CPT | Performed by: INTERNAL MEDICINE

## 2022-05-10 PROCEDURE — 85027 COMPLETE CBC AUTOMATED: CPT

## 2022-05-10 PROCEDURE — 80053 COMPREHEN METABOLIC PANEL: CPT

## 2022-05-10 PROCEDURE — 84443 ASSAY THYROID STIM HORMONE: CPT

## 2022-05-10 PROCEDURE — 3074F SYST BP LT 130 MM HG: CPT | Performed by: INTERNAL MEDICINE

## 2022-05-10 PROCEDURE — 83036 HEMOGLOBIN GLYCOSYLATED A1C: CPT

## 2022-05-10 RX ORDER — FLUTICASONE FUROATE AND VILANTEROL TRIFENATATE 100; 25 UG/1; UG/1
1 POWDER RESPIRATORY (INHALATION) DAILY
Qty: 3 EACH | Refills: 3 | Status: SHIPPED | OUTPATIENT
Start: 2022-05-10

## 2022-05-10 RX ORDER — ALPRAZOLAM 1 MG/1
1 TABLET ORAL DAILY PRN
Qty: 30 TABLET | Refills: 0 | Status: SHIPPED | OUTPATIENT
Start: 2022-05-10

## 2022-05-10 RX ORDER — PREDNISONE 20 MG/1
20 TABLET ORAL 2 TIMES DAILY WITH MEALS
Qty: 14 TABLET | Refills: 0 | Status: SHIPPED | OUTPATIENT
Start: 2022-05-10 | End: 2022-05-17

## 2022-05-10 RX ORDER — AMLODIPINE BESYLATE 10 MG/1
10 TABLET ORAL DAILY
Qty: 90 TABLET | Refills: 3 | Status: SHIPPED | OUTPATIENT
Start: 2022-05-10

## 2022-05-10 RX ORDER — ALBUTEROL SULFATE 90 UG/1
2 AEROSOL, METERED RESPIRATORY (INHALATION) EVERY 6 HOURS PRN
Qty: 3 EACH | Refills: 3 | Status: SHIPPED | OUTPATIENT
Start: 2022-05-10

## 2022-05-10 RX ORDER — VARENICLINE TARTRATE 1 MG/1
1 TABLET, FILM COATED ORAL 2 TIMES DAILY
Qty: 60 TABLET | Refills: 6 | Status: SHIPPED | OUTPATIENT
Start: 2022-05-10 | End: 2022-12-06

## 2022-05-10 RX ORDER — CHLORTHALIDONE 25 MG/1
25 TABLET ORAL DAILY
Qty: 90 TABLET | Refills: 3 | Status: SHIPPED | OUTPATIENT
Start: 2022-05-10

## 2022-05-10 RX ORDER — ESCITALOPRAM OXALATE 20 MG/1
20 TABLET ORAL DAILY
Qty: 90 TABLET | Refills: 3 | Status: SHIPPED | OUTPATIENT
Start: 2022-05-10

## 2022-05-10 RX ORDER — TELMISARTAN 80 MG/1
80 TABLET ORAL NIGHTLY
Qty: 90 TABLET | Refills: 3 | Status: SHIPPED | OUTPATIENT
Start: 2022-05-10

## 2022-06-02 DIAGNOSIS — F41.1 GENERALIZED ANXIETY DISORDER WITH PANIC ATTACKS: ICD-10-CM

## 2022-06-02 DIAGNOSIS — F41.0 GENERALIZED ANXIETY DISORDER WITH PANIC ATTACKS: ICD-10-CM

## 2022-06-02 RX ORDER — ALPRAZOLAM 1 MG/1
1 TABLET ORAL DAILY PRN
Qty: 30 TABLET | Refills: 0 | Status: SHIPPED | OUTPATIENT
Start: 2022-06-02

## 2022-06-02 NOTE — TELEPHONE ENCOUNTER
Please review. Protocol Failed or has no Protocol  Requested Prescriptions   Pending Prescriptions Disp Refills    ALPRAZolam 1 MG Oral Tab 30 tablet 0     Sig: Take 1 tablet (1 mg total) by mouth daily as needed (panic attack).         There is no refill protocol information for this order         Future Appointments         Provider Department Appt Notes    In 2 months Guido Kaur MD [address] [place] 3m asthma fu          Recent Outpatient Visits              3 weeks ago Annual physical exam    150 Lucy Pantoja MD    Office Visit    5 months ago Hypertension goal BP (blood pressure) < 130/80    150 Lucy Pantoja MD    Office Visit    10 months ago Hypertension goal BP (blood pressure) < 130/80    [address] Lucy Pantoja MD    Office Visit    11 months ago Hypertension goal BP (blood pressure) < 130/80    150 Lucy Pantoja MD    Office Visit    1 year ago Hypertension goal BP (blood pressure) < 130/80    [address] Lucy Pantoja MD    Telemedicine

## 2022-06-02 NOTE — TELEPHONE ENCOUNTER
Please review. Protocol Failed or has no Protocol  Requested Prescriptions   Pending Prescriptions Disp Refills    ALPRAZolam 1 MG Oral Tab 30 tablet 0     Sig: Take 1 tablet (1 mg total) by mouth daily as needed (panic attack).         There is no refill protocol information for this order         Future Appointments         Provider Department Appt Notes    In 2 months Pedro Luis Snyder MD [facility], [facility], [address], [place] 3m asthma fu          Recent Outpatient Visits              3 weeks ago Annual physical exam    150 Katia Pantoja MD    Office Visit    5 months ago Hypertension goal BP (blood pressure) < 130/80    150 Katia Pantoja MD    Office Visit    10 months ago Hypertension goal BP (blood pressure) < 130/80    150 Katia Pantoja MD    Office Visit    11 months ago Hypertension goal BP (blood pressure) < 130/80    150 Katia Pantoja MD    Office Visit    1 year ago Hypertension goal BP (blood pressure) < 130/80    150 Katia Pantoja MD    Telemedicine

## 2022-06-06 DIAGNOSIS — I10 HYPERTENSION GOAL BP (BLOOD PRESSURE) < 130/80: ICD-10-CM

## 2022-06-06 RX ORDER — TELMISARTAN 80 MG/1
80 TABLET ORAL NIGHTLY
Qty: 90 TABLET | Refills: 3 | OUTPATIENT
Start: 2022-06-06

## 2022-07-06 DIAGNOSIS — F41.1 GENERALIZED ANXIETY DISORDER WITH PANIC ATTACKS: ICD-10-CM

## 2022-07-06 DIAGNOSIS — F41.0 GENERALIZED ANXIETY DISORDER WITH PANIC ATTACKS: ICD-10-CM

## 2022-07-06 RX ORDER — ALPRAZOLAM 1 MG/1
1 TABLET ORAL DAILY PRN
Qty: 30 TABLET | Refills: 0 | Status: SHIPPED | OUTPATIENT
Start: 2022-07-06

## 2022-07-08 DIAGNOSIS — M54.50 CHRONIC BILATERAL LOW BACK PAIN WITHOUT SCIATICA: ICD-10-CM

## 2022-07-08 DIAGNOSIS — G89.29 CHRONIC BILATERAL LOW BACK PAIN WITHOUT SCIATICA: ICD-10-CM

## 2022-07-11 RX ORDER — CYCLOBENZAPRINE HCL 5 MG
TABLET ORAL
Qty: 90 TABLET | Refills: 0 | Status: SHIPPED | OUTPATIENT
Start: 2022-07-11

## 2022-08-01 DIAGNOSIS — F41.0 GENERALIZED ANXIETY DISORDER WITH PANIC ATTACKS: ICD-10-CM

## 2022-08-01 DIAGNOSIS — F41.1 GENERALIZED ANXIETY DISORDER WITH PANIC ATTACKS: ICD-10-CM

## 2022-08-01 RX ORDER — ALPRAZOLAM 1 MG/1
1 TABLET ORAL DAILY PRN
Qty: 30 TABLET | Refills: 1 | Status: SHIPPED | OUTPATIENT
Start: 2022-08-01 | End: 2022-08-25

## 2022-08-10 ENCOUNTER — TELEPHONE (OUTPATIENT)
Dept: INTERNAL MEDICINE CLINIC | Facility: CLINIC | Age: 37
End: 2022-08-10

## 2022-08-10 ENCOUNTER — OFFICE VISIT (OUTPATIENT)
Dept: INTERNAL MEDICINE CLINIC | Facility: CLINIC | Age: 37
End: 2022-08-10
Payer: COMMERCIAL

## 2022-08-10 VITALS
DIASTOLIC BLOOD PRESSURE: 83 MMHG | WEIGHT: 249 LBS | BODY MASS INDEX: 36.88 KG/M2 | SYSTOLIC BLOOD PRESSURE: 133 MMHG | HEART RATE: 96 BPM | HEIGHT: 69 IN

## 2022-08-10 DIAGNOSIS — M54.50 CHRONIC BILATERAL LOW BACK PAIN WITHOUT SCIATICA: ICD-10-CM

## 2022-08-10 DIAGNOSIS — G89.29 CHRONIC BILATERAL LOW BACK PAIN WITHOUT SCIATICA: ICD-10-CM

## 2022-08-10 DIAGNOSIS — M70.62 TROCHANTERIC BURSITIS OF LEFT HIP: ICD-10-CM

## 2022-08-10 DIAGNOSIS — E66.09 CLASS 2 OBESITY DUE TO EXCESS CALORIES WITHOUT SERIOUS COMORBIDITY WITH BODY MASS INDEX (BMI) OF 36.0 TO 36.9 IN ADULT: ICD-10-CM

## 2022-08-10 DIAGNOSIS — J45.41 MODERATE PERSISTENT ASTHMA WITH ACUTE EXACERBATION: Primary | ICD-10-CM

## 2022-08-10 PROBLEM — E66.812 CLASS 2 OBESITY DUE TO EXCESS CALORIES WITHOUT SERIOUS COMORBIDITY WITH BODY MASS INDEX (BMI) OF 36.0 TO 36.9 IN ADULT: Status: ACTIVE | Noted: 2022-08-10

## 2022-08-10 PROCEDURE — 3075F SYST BP GE 130 - 139MM HG: CPT | Performed by: INTERNAL MEDICINE

## 2022-08-10 PROCEDURE — 3079F DIAST BP 80-89 MM HG: CPT | Performed by: INTERNAL MEDICINE

## 2022-08-10 PROCEDURE — 99214 OFFICE O/P EST MOD 30 MIN: CPT | Performed by: INTERNAL MEDICINE

## 2022-08-10 PROCEDURE — 3008F BODY MASS INDEX DOCD: CPT | Performed by: INTERNAL MEDICINE

## 2022-08-10 RX ORDER — PREDNISONE 10 MG/1
TABLET ORAL
Qty: 18 TABLET | Refills: 0 | Status: SHIPPED | OUTPATIENT
Start: 2022-08-10 | End: 2022-08-19

## 2022-08-10 RX ORDER — FLUTICASONE FUROATE AND VILANTEROL 100; 25 UG/1; UG/1
1 POWDER RESPIRATORY (INHALATION) DAILY
Qty: 3 EACH | Refills: 3 | Status: SHIPPED | OUTPATIENT
Start: 2022-08-10

## 2022-08-10 RX ORDER — ALBUTEROL SULFATE 90 UG/1
2 AEROSOL, METERED RESPIRATORY (INHALATION) EVERY 6 HOURS PRN
Qty: 3 EACH | Refills: 3 | Status: SHIPPED | OUTPATIENT
Start: 2022-08-10

## 2022-08-10 RX ORDER — CYCLOBENZAPRINE HCL 5 MG
TABLET ORAL
Qty: 90 TABLET | Refills: 3 | OUTPATIENT
Start: 2022-08-10

## 2022-08-10 RX ORDER — CYCLOBENZAPRINE HCL 5 MG
TABLET ORAL EVERY 8 HOURS PRN
Qty: 90 TABLET | Refills: 3 | Status: SHIPPED | OUTPATIENT
Start: 2022-08-10

## 2022-08-10 NOTE — PATIENT INSTRUCTIONS
To help you lose weight I will send to once a week under the skin injectable medications, these are called GLP-1 agonist.  The first 1 is semaglutide known as Ozempic and the other is dulaglutide known as Trulicity. These were originally designed for diabetes but they have noted to have almost 20 to 25% weight loss. Please check with your pharmacy/insurance and if either 1 of these are covered, cost effective, and you would like to start them let me know and I can resend the appropriate medication with the appropriate signature and then you can fill that and start. The goal here is by losing weight through reduce your blood pressure, cholesterol, risk of diabetes, risk of arthritis, and get you feeling better overall.   Again the limiting factor here is simply the cost, there are coupons available from the  that make these about 30 bucks a month, since we anticipate short-term may be 6 months to a year at most it is reasonable if you wanted to pay cash to go this route but lets try the insurance first.

## 2022-08-11 ENCOUNTER — TELEPHONE (OUTPATIENT)
Dept: INTERNAL MEDICINE CLINIC | Facility: CLINIC | Age: 37
End: 2022-08-11

## 2022-08-11 NOTE — TELEPHONE ENCOUNTER
Prior Authorization    Med; Trulicity 6.62HC /9.5 ML Pen Injectors    Key:  [id number]    Patient last name:  Inez Morgan    :  1985      Current Outpatient Medications   Medication Sig Dispense Refill

## 2022-08-17 ENCOUNTER — TELEPHONE (OUTPATIENT)
Dept: INTERNAL MEDICINE CLINIC | Facility: CLINIC | Age: 37
End: 2022-08-17

## 2022-08-17 NOTE — TELEPHONE ENCOUNTER
OZEMPIC (0.2 OR 0.5 MG / DOSE)   2MG /1.5 ML PEN INJECTORS          KEY: [id number]  LAST NAME: Leonel Hernandez  : 1985

## 2022-08-25 DIAGNOSIS — F41.0 GENERALIZED ANXIETY DISORDER WITH PANIC ATTACKS: ICD-10-CM

## 2022-08-25 DIAGNOSIS — F41.1 GENERALIZED ANXIETY DISORDER WITH PANIC ATTACKS: ICD-10-CM

## 2022-08-25 NOTE — TELEPHONE ENCOUNTER
Please Review. Protocol failed or has no protocol  Requested Prescriptions   Pending Prescriptions Disp Refills    ALPRAZolam 1 MG Oral Tab 30 tablet 1     Sig: Take 1 tablet (1 mg total) by mouth daily as needed (panic attack).         There is no refill protocol information for this order          Recent Outpatient Visits              2 weeks ago Moderate persistent asthma with acute exacerbation    Sonja Shook MD    Office Visit    3 months ago Annual physical exam    Stella Coello MD    Office Visit    8 months ago Hypertension goal BP (blood pressure) < 130/80    Sonja Shook MD    Office Visit    1 year ago Hypertension goal BP (blood pressure) < 130/80    Stella Coello MD    Office Visit    1 year ago Hypertension goal BP (blood pressure) < 130/80    Sonja Shook MD    Office Visit

## 2022-08-30 DIAGNOSIS — F41.0 GENERALIZED ANXIETY DISORDER WITH PANIC ATTACKS: ICD-10-CM

## 2022-08-30 DIAGNOSIS — F41.1 GENERALIZED ANXIETY DISORDER WITH PANIC ATTACKS: ICD-10-CM

## 2022-08-30 RX ORDER — ALPRAZOLAM 1 MG/1
1 TABLET ORAL DAILY PRN
Qty: 30 TABLET | Refills: 1 | OUTPATIENT
Start: 2022-08-30

## 2022-08-30 NOTE — TELEPHONE ENCOUNTER
Duplicate request, previously addressed.    See refill encounter 8/25/22, already sent to Dr Harvey Beck for approval.

## 2022-09-05 RX ORDER — ALPRAZOLAM 1 MG/1
1 TABLET ORAL DAILY PRN
Qty: 30 TABLET | Refills: 1 | Status: SHIPPED | OUTPATIENT
Start: 2022-09-05

## 2022-09-30 DIAGNOSIS — F41.1 GENERALIZED ANXIETY DISORDER WITH PANIC ATTACKS: ICD-10-CM

## 2022-09-30 DIAGNOSIS — F41.0 GENERALIZED ANXIETY DISORDER WITH PANIC ATTACKS: ICD-10-CM

## 2022-10-02 NOTE — TELEPHONE ENCOUNTER
Please review; protocol failed/no protocol. Requested Prescriptions   Pending Prescriptions Disp Refills    ALPRAZolam 1 MG Oral Tab 30 tablet 1     Sig: Take 1 tablet (1 mg total) by mouth daily as needed (panic attack).         There is no refill protocol information for this order         Recent Outpatient Visits              1 month ago Moderate persistent asthma with acute exacerbation    Dodie Hopson MD    Office Visit    4 months ago Annual physical exam    Dodie Hopson MD    Office Visit    9 months ago Hypertension goal BP (blood pressure) < 130/80    Dodie Hopson MD    Office Visit    1 year ago Hypertension goal BP (blood pressure) < 130/80    Daysi Upton MD    Office Visit    1 year ago Hypertension goal BP (blood pressure) < 130/80    Dodie Hopson MD    Office Visit

## 2022-10-10 RX ORDER — ALPRAZOLAM 1 MG/1
1 TABLET ORAL DAILY PRN
Qty: 30 TABLET | Refills: 0 | Status: SHIPPED | OUTPATIENT
Start: 2022-10-10

## 2022-11-28 DIAGNOSIS — I10 HYPERTENSION GOAL BP (BLOOD PRESSURE) < 130/80: ICD-10-CM

## 2022-11-28 DIAGNOSIS — F41.1 GENERALIZED ANXIETY DISORDER WITH PANIC ATTACKS: ICD-10-CM

## 2022-11-28 DIAGNOSIS — F41.0 GENERALIZED ANXIETY DISORDER WITH PANIC ATTACKS: ICD-10-CM

## 2022-11-28 RX ORDER — ALPRAZOLAM 1 MG/1
1 TABLET ORAL DAILY PRN
Qty: 30 TABLET | Refills: 0 | Status: SHIPPED | OUTPATIENT
Start: 2022-11-28

## 2022-11-28 RX ORDER — TELMISARTAN 80 MG/1
80 TABLET ORAL NIGHTLY
Qty: 90 TABLET | Refills: 1 | Status: SHIPPED | OUTPATIENT
Start: 2022-11-28

## 2022-11-28 NOTE — TELEPHONE ENCOUNTER
Please review. Protocol failed / No protocol. Requested Prescriptions   Pending Prescriptions Disp Refills    telmisartan 80 MG Oral Tab 90 tablet 3     Sig: Take 1 tablet (80 mg total) by mouth nightly. FOR BLOOD PRESSURE       Hypertensive Medications Protocol Failed - 11/28/2022  7:23 AM        Failed - CMP or BMP in past 6 months     No results found for this or any previous visit (from the past 4392 hour(s)).             Passed - In person appointment in the past 12 or next 3 months     Recent Outpatient Visits              3 months ago Moderate persistent asthma with acute exacerbation    David Mcgee MD    Office Visit    6 months ago Annual physical exam    Dafne Pittman MD    Office Visit    11 months ago Hypertension goal BP (blood pressure) < 130/80    Dafne Pittman MD    Office Visit    1 year ago Hypertension goal BP (blood pressure) < 130/80    Dafne Pittman MD    Office Visit    1 year ago Hypertension goal BP (blood pressure) < 130/80    Dafne Pittman MD    Office Visit                      Passed - Last BP reading less than 140/90     BP Readings from Last 1 Encounters:  08/10/22 : 133/83              Passed - In person appointment or virtual visit in the past 6 months     Recent Outpatient Visits              3 months ago Moderate persistent asthma with acute exacerbation    Dafne Pittman MD    Office Visit    6 months ago Annual physical exam    Dafne Pittman MD    Office Visit    11 months ago Hypertension goal BP (blood pressure) < 130/80    Dafne Pittman MD    Office Visit    1 year ago Hypertension goal BP (blood pressure) < 130/80    Arnoldo Pittman, Chuck Song MD    Office Visit    1 year ago Hypertension goal BP (blood pressure) < 130/80    Yelitza Ken MD    Office Visit                      Passed [facility] or [facility] > 50     GFR Evaluation  GFRNAA: 108 , resulted on 5/10/2022            ALPRAZolam 1 MG Oral Tab 30 tablet 0     Sig: Take 1 tablet (1 mg total) by mouth daily as needed (panic attack).        There is no refill protocol information for this order          Recent Outpatient Visits              3 months ago Moderate persistent asthma with acute exacerbation    Yelitza Ken MD    Office Visit    6 months ago Annual physical exam    Yelitza Ken MD    Office Visit    11 months ago Hypertension goal BP (blood pressure) < 130/80    Yelitza Ken MD    Office Visit    1 year ago Hypertension goal BP (blood pressure) < 130/80    Yelitza Ken MD    Office Visit    1 year ago Hypertension goal BP (blood pressure) < 130/80    Karma Walters MD    Office Visit

## 2022-12-06 ENCOUNTER — TELEPHONE (OUTPATIENT)
Dept: INTERNAL MEDICINE CLINIC | Facility: CLINIC | Age: 37
End: 2022-12-06

## 2022-12-06 NOTE — TELEPHONE ENCOUNTER
A Prior Authorization has been started for   Ozempic ( 0.25 or 0.5)  Key [id number]  Patient last name Pebbles Fong     1985

## 2022-12-07 NOTE — TELEPHONE ENCOUNTER
Patient has been informed at visit if this medication is denied by their insurance they are to check  website for coupons or continue as discussed in office/my last note. No prior auth to be done. Ok to close this encounter.

## 2022-12-21 DIAGNOSIS — F41.0 GENERALIZED ANXIETY DISORDER WITH PANIC ATTACKS: ICD-10-CM

## 2022-12-21 DIAGNOSIS — F41.1 GENERALIZED ANXIETY DISORDER WITH PANIC ATTACKS: ICD-10-CM

## 2022-12-21 RX ORDER — ALPRAZOLAM 1 MG/1
1 TABLET ORAL DAILY PRN
Qty: 30 TABLET | Refills: 0 | Status: SHIPPED | OUTPATIENT
Start: 2022-12-21

## 2022-12-30 DIAGNOSIS — Z71.6 ENCOUNTER FOR SMOKING CESSATION COUNSELING: ICD-10-CM

## 2023-01-03 RX ORDER — VARENICLINE TARTRATE 1 MG/1
1 TABLET, FILM COATED ORAL 2 TIMES DAILY
Qty: 60 TABLET | Refills: 6 | Status: SHIPPED | OUTPATIENT
Start: 2023-01-03 | End: 2023-08-01

## 2023-01-11 DIAGNOSIS — I10 HYPERTENSION GOAL BP (BLOOD PRESSURE) < 130/80: ICD-10-CM

## 2023-01-11 DIAGNOSIS — F41.1 GENERALIZED ANXIETY DISORDER WITH PANIC ATTACKS: ICD-10-CM

## 2023-01-11 DIAGNOSIS — F41.0 GENERALIZED ANXIETY DISORDER WITH PANIC ATTACKS: ICD-10-CM

## 2023-01-11 RX ORDER — CHLORTHALIDONE 25 MG/1
25 TABLET ORAL DAILY
Qty: 90 TABLET | Refills: 3 | Status: SHIPPED | OUTPATIENT
Start: 2023-01-11

## 2023-01-11 RX ORDER — ESCITALOPRAM OXALATE 20 MG/1
20 TABLET ORAL DAILY
Qty: 90 TABLET | Refills: 3 | Status: SHIPPED | OUTPATIENT
Start: 2023-01-11

## 2023-01-25 DIAGNOSIS — F41.1 GENERALIZED ANXIETY DISORDER WITH PANIC ATTACKS: ICD-10-CM

## 2023-01-25 DIAGNOSIS — F41.0 GENERALIZED ANXIETY DISORDER WITH PANIC ATTACKS: ICD-10-CM

## 2023-01-25 RX ORDER — ALPRAZOLAM 1 MG/1
1 TABLET ORAL DAILY PRN
Qty: 30 TABLET | Refills: 1 | Status: SHIPPED | OUTPATIENT
Start: 2023-01-25

## 2023-01-31 ENCOUNTER — LAB ENCOUNTER (OUTPATIENT)
Dept: LAB | Age: 38
End: 2023-01-31
Attending: INTERNAL MEDICINE
Payer: COMMERCIAL

## 2023-01-31 ENCOUNTER — OFFICE VISIT (OUTPATIENT)
Dept: INTERNAL MEDICINE CLINIC | Facility: CLINIC | Age: 38
End: 2023-01-31

## 2023-01-31 VITALS
DIASTOLIC BLOOD PRESSURE: 73 MMHG | BODY MASS INDEX: 36.88 KG/M2 | HEIGHT: 69 IN | SYSTOLIC BLOOD PRESSURE: 115 MMHG | WEIGHT: 249 LBS | HEART RATE: 93 BPM

## 2023-01-31 DIAGNOSIS — I10 HYPERTENSION GOAL BP (BLOOD PRESSURE) < 130/80: Primary | ICD-10-CM

## 2023-01-31 DIAGNOSIS — F41.1 GENERALIZED ANXIETY DISORDER WITH PANIC ATTACKS: ICD-10-CM

## 2023-01-31 DIAGNOSIS — E87.1 HYPONATREMIA: ICD-10-CM

## 2023-01-31 DIAGNOSIS — F41.0 GENERALIZED ANXIETY DISORDER WITH PANIC ATTACKS: ICD-10-CM

## 2023-01-31 DIAGNOSIS — Z23 IMMUNIZATION DUE: ICD-10-CM

## 2023-01-31 DIAGNOSIS — Z13.228 SCREENING FOR ENDOCRINE, NUTRITIONAL, METABOLIC AND IMMUNITY DISORDER: ICD-10-CM

## 2023-01-31 DIAGNOSIS — J45.41 MODERATE PERSISTENT ASTHMA WITH ACUTE EXACERBATION: ICD-10-CM

## 2023-01-31 DIAGNOSIS — Z13.21 SCREENING FOR ENDOCRINE, NUTRITIONAL, METABOLIC AND IMMUNITY DISORDER: ICD-10-CM

## 2023-01-31 DIAGNOSIS — Z13.29 SCREENING FOR ENDOCRINE, NUTRITIONAL, METABOLIC AND IMMUNITY DISORDER: ICD-10-CM

## 2023-01-31 DIAGNOSIS — Z13.0 SCREENING FOR ENDOCRINE, NUTRITIONAL, METABOLIC AND IMMUNITY DISORDER: ICD-10-CM

## 2023-01-31 PROBLEM — J45.909 MODERATE ASTHMA (HCC): Status: ACTIVE | Noted: 2022-08-10

## 2023-01-31 PROBLEM — J45.909 MODERATE ASTHMA: Status: ACTIVE | Noted: 2022-08-10

## 2023-01-31 LAB
ALBUMIN SERPL-MCNC: 4.6 G/DL (ref 3.4–5)
ALBUMIN/GLOB SERPL: 1.4 {RATIO} (ref 1–2)
ALP LIVER SERPL-CCNC: 89 U/L
ALT SERPL-CCNC: 43 U/L
ANION GAP SERPL CALC-SCNC: 7 MMOL/L (ref 0–18)
AST SERPL-CCNC: 18 U/L (ref 15–37)
BILIRUB SERPL-MCNC: 0.6 MG/DL (ref 0.1–2)
BUN BLD-MCNC: 20 MG/DL (ref 7–18)
BUN/CREAT SERPL: 22.5 (ref 10–20)
CALCIUM BLD-MCNC: 10.1 MG/DL (ref 8.5–10.1)
CHLORIDE SERPL-SCNC: 95 MMOL/L (ref 98–112)
CHOLEST SERPL-MCNC: 182 MG/DL (ref ?–200)
CO2 SERPL-SCNC: 28 MMOL/L (ref 21–32)
CREAT BLD-MCNC: 0.89 MG/DL
EST. AVERAGE GLUCOSE BLD GHB EST-MCNC: 105 MG/DL (ref 68–126)
FASTING PATIENT LIPID ANSWER: YES
FASTING STATUS PATIENT QL REPORTED: YES
GFR SERPLBLD BASED ON 1.73 SQ M-ARVRAT: 112 ML/MIN/1.73M2 (ref 60–?)
GLOBULIN PLAS-MCNC: 3.3 G/DL (ref 2.8–4.4)
GLUCOSE BLD-MCNC: 107 MG/DL (ref 70–99)
HBA1C MFR BLD: 5.3 % (ref ?–5.7)
HDLC SERPL-MCNC: 49 MG/DL (ref 40–59)
LDLC SERPL CALC-MCNC: 118 MG/DL (ref ?–100)
NONHDLC SERPL-MCNC: 133 MG/DL (ref ?–130)
OSMOLALITY SERPL CALC.SUM OF ELEC: 273 MOSM/KG (ref 275–295)
POTASSIUM SERPL-SCNC: 3.8 MMOL/L (ref 3.5–5.1)
PROT SERPL-MCNC: 7.9 G/DL (ref 6.4–8.2)
SODIUM SERPL-SCNC: 130 MMOL/L (ref 136–145)
TRIGL SERPL-MCNC: 83 MG/DL (ref 30–149)
VLDLC SERPL CALC-MCNC: 14 MG/DL (ref 0–30)

## 2023-01-31 PROCEDURE — 36415 COLL VENOUS BLD VENIPUNCTURE: CPT | Performed by: INTERNAL MEDICINE

## 2023-01-31 PROCEDURE — 90715 TDAP VACCINE 7 YRS/> IM: CPT | Performed by: INTERNAL MEDICINE

## 2023-01-31 PROCEDURE — 90471 IMMUNIZATION ADMIN: CPT | Performed by: INTERNAL MEDICINE

## 2023-01-31 PROCEDURE — 80053 COMPREHEN METABOLIC PANEL: CPT | Performed by: INTERNAL MEDICINE

## 2023-01-31 PROCEDURE — 3008F BODY MASS INDEX DOCD: CPT | Performed by: INTERNAL MEDICINE

## 2023-01-31 PROCEDURE — 90686 IIV4 VACC NO PRSV 0.5 ML IM: CPT | Performed by: INTERNAL MEDICINE

## 2023-01-31 PROCEDURE — 90472 IMMUNIZATION ADMIN EACH ADD: CPT | Performed by: INTERNAL MEDICINE

## 2023-01-31 PROCEDURE — 3078F DIAST BP <80 MM HG: CPT | Performed by: INTERNAL MEDICINE

## 2023-01-31 PROCEDURE — 80061 LIPID PANEL: CPT | Performed by: INTERNAL MEDICINE

## 2023-01-31 PROCEDURE — 3074F SYST BP LT 130 MM HG: CPT | Performed by: INTERNAL MEDICINE

## 2023-01-31 PROCEDURE — 83036 HEMOGLOBIN GLYCOSYLATED A1C: CPT | Performed by: INTERNAL MEDICINE

## 2023-01-31 PROCEDURE — 99214 OFFICE O/P EST MOD 30 MIN: CPT | Performed by: INTERNAL MEDICINE

## 2023-01-31 RX ORDER — AMLODIPINE BESYLATE 10 MG/1
10 TABLET ORAL DAILY
Qty: 90 TABLET | Refills: 9 | Status: SHIPPED | OUTPATIENT
Start: 2023-01-31

## 2023-01-31 RX ORDER — CHLORTHALIDONE 25 MG/1
25 TABLET ORAL DAILY
Qty: 90 TABLET | Refills: 9 | Status: SHIPPED | OUTPATIENT
Start: 2023-01-31 | End: 2023-02-05

## 2023-01-31 RX ORDER — ALPRAZOLAM 1 MG/1
1 TABLET ORAL DAILY PRN
Qty: 30 TABLET | Refills: 1 | Status: SHIPPED | OUTPATIENT
Start: 2023-01-31

## 2023-01-31 RX ORDER — ALBUTEROL SULFATE 90 UG/1
2 AEROSOL, METERED RESPIRATORY (INHALATION) EVERY 6 HOURS PRN
Qty: 3 EACH | Refills: 3 | Status: SHIPPED | OUTPATIENT
Start: 2023-01-31

## 2023-01-31 RX ORDER — ESCITALOPRAM OXALATE 20 MG/1
20 TABLET ORAL DAILY
Qty: 90 TABLET | Refills: 9 | Status: SHIPPED | OUTPATIENT
Start: 2023-01-31

## 2023-01-31 RX ORDER — TELMISARTAN 80 MG/1
80 TABLET ORAL NIGHTLY
Qty: 90 TABLET | Refills: 9 | Status: SHIPPED | OUTPATIENT
Start: 2023-01-31

## 2023-02-05 RX ORDER — SPIRONOLACTONE 25 MG/1
12.5 TABLET ORAL DAILY
Qty: 90 TABLET | Refills: 9 | Status: SHIPPED | OUTPATIENT
Start: 2023-02-05

## 2023-03-08 DIAGNOSIS — F41.0 GENERALIZED ANXIETY DISORDER WITH PANIC ATTACKS: ICD-10-CM

## 2023-03-08 DIAGNOSIS — Z23 IMMUNIZATION DUE: ICD-10-CM

## 2023-03-08 DIAGNOSIS — F41.1 GENERALIZED ANXIETY DISORDER WITH PANIC ATTACKS: ICD-10-CM

## 2023-03-08 RX ORDER — ALPRAZOLAM 1 MG/1
1 TABLET ORAL DAILY PRN
Qty: 30 TABLET | Refills: 1 | Status: CANCELLED | OUTPATIENT
Start: 2023-03-08

## 2023-03-25 ENCOUNTER — APPOINTMENT (OUTPATIENT)
Dept: GENERAL RADIOLOGY | Age: 38
End: 2023-03-25
Attending: NURSE PRACTITIONER
Payer: COMMERCIAL

## 2023-03-25 ENCOUNTER — HOSPITAL ENCOUNTER (OUTPATIENT)
Age: 38
Discharge: HOME | End: 2023-03-25
Payer: COMMERCIAL

## 2023-03-25 VITALS
RESPIRATION RATE: 24 BRPM | WEIGHT: 240 LBS | HEIGHT: 69 IN | SYSTOLIC BLOOD PRESSURE: 148 MMHG | BODY MASS INDEX: 35.55 KG/M2 | TEMPERATURE: 99 F | OXYGEN SATURATION: 98 % | DIASTOLIC BLOOD PRESSURE: 92 MMHG | HEART RATE: 102 BPM

## 2023-03-25 DIAGNOSIS — S97.82XA CRUSHING INJURY OF LEFT FOOT, INITIAL ENCOUNTER: ICD-10-CM

## 2023-03-25 DIAGNOSIS — Q68.8 OS TRIGONUM: ICD-10-CM

## 2023-03-25 DIAGNOSIS — S99.922A INJURY OF LEFT FOOT, INITIAL ENCOUNTER: Primary | ICD-10-CM

## 2023-03-25 PROCEDURE — 73630 X-RAY EXAM OF FOOT: CPT | Performed by: NURSE PRACTITIONER

## 2023-03-25 PROCEDURE — 73610 X-RAY EXAM OF ANKLE: CPT | Performed by: NURSE PRACTITIONER

## 2023-03-25 PROCEDURE — 99213 OFFICE O/P EST LOW 20 MIN: CPT | Performed by: NURSE PRACTITIONER

## 2023-03-25 PROCEDURE — A6449 LT COMPRES BAND >=3" <5"/YD: HCPCS | Performed by: NURSE PRACTITIONER

## 2023-03-25 PROCEDURE — E0114 CRUTCH UNDERARM PAIR NO WOOD: HCPCS | Performed by: NURSE PRACTITIONER

## 2023-03-25 RX ORDER — IBUPROFEN 600 MG/1
600 TABLET ORAL ONCE
Status: COMPLETED | OUTPATIENT
Start: 2023-03-25 | End: 2023-03-25

## 2023-03-25 NOTE — DISCHARGE INSTRUCTIONS
No fracture seen on the x-rays. Tylenol or ibuprofen as needed for pain. Ice. Elevate. Ace wrap for swelling and support. Follow-up with your primary doctor. Go to the emergency room if pain is severe or the foot is numb.

## 2023-04-19 DIAGNOSIS — F41.1 GENERALIZED ANXIETY DISORDER WITH PANIC ATTACKS: ICD-10-CM

## 2023-04-19 DIAGNOSIS — F41.0 GENERALIZED ANXIETY DISORDER WITH PANIC ATTACKS: ICD-10-CM

## 2023-04-19 DIAGNOSIS — Z23 IMMUNIZATION DUE: ICD-10-CM

## 2023-04-20 RX ORDER — ALPRAZOLAM 1 MG/1
1 TABLET ORAL DAILY PRN
Qty: 30 TABLET | Refills: 1 | Status: SHIPPED | OUTPATIENT
Start: 2023-04-20

## 2023-04-20 NOTE — TELEPHONE ENCOUNTER
Please review. Protocol failed or has no protocol. Dr Lamar Burnham out on leave. Requested Prescriptions   Pending Prescriptions Disp Refills    ALPRAZOLAM 1 MG Oral Tab [Pharmacy Med Name: ALPRAZOLAM 1 MG TABLET] 30 tablet 1     Sig: Take 1 tablet (1 mg total) by mouth daily as needed (panic attack).        There is no refill protocol information for this order          Recent Outpatient Visits              2 months ago Hypertension goal BP (blood pressure) < 130/80    Angelica Graham MD    Office Visit    8 months ago Moderate persistent asthma with acute exacerbation    Angelica Graham MD    Office Visit    11 months ago Annual physical exam    Angelica Graham MD    Office Visit    1 year ago Hypertension goal BP (blood pressure) < 130/80    Angelica Graham MD    Office Visit    1 year ago Hypertension goal BP (blood pressure) < 130/80    Angelica Graham MD    Office Visit            Future Appointments         Provider Department Appt Notes    In 1 month MD Kosta Celaya [place] physical

## 2023-06-20 DIAGNOSIS — F41.0 GENERALIZED ANXIETY DISORDER WITH PANIC ATTACKS: ICD-10-CM

## 2023-06-20 DIAGNOSIS — F41.1 GENERALIZED ANXIETY DISORDER WITH PANIC ATTACKS: ICD-10-CM

## 2023-06-20 DIAGNOSIS — Z23 IMMUNIZATION DUE: ICD-10-CM

## 2023-06-20 RX ORDER — ALPRAZOLAM 1 MG/1
1 TABLET ORAL DAILY PRN
Qty: 30 TABLET | Refills: 1 | OUTPATIENT
Start: 2023-06-20

## 2023-06-29 ENCOUNTER — OFFICE VISIT (OUTPATIENT)
Dept: INTERNAL MEDICINE CLINIC | Facility: CLINIC | Age: 38
End: 2023-06-29

## 2023-06-29 ENCOUNTER — LAB ENCOUNTER (OUTPATIENT)
Dept: LAB | Age: 38
End: 2023-06-29
Attending: INTERNAL MEDICINE
Payer: COMMERCIAL

## 2023-06-29 VITALS
DIASTOLIC BLOOD PRESSURE: 80 MMHG | BODY MASS INDEX: 35.7 KG/M2 | SYSTOLIC BLOOD PRESSURE: 130 MMHG | WEIGHT: 241 LBS | HEIGHT: 69 IN | HEART RATE: 89 BPM

## 2023-06-29 DIAGNOSIS — E87.1 HYPONATREMIA: ICD-10-CM

## 2023-06-29 DIAGNOSIS — I10 HYPERTENSION GOAL BP (BLOOD PRESSURE) < 130/80: ICD-10-CM

## 2023-06-29 DIAGNOSIS — Z00.00 ANNUAL PHYSICAL EXAM: Primary | ICD-10-CM

## 2023-06-29 DIAGNOSIS — E55.9 VITAMIN D DEFICIENCY: ICD-10-CM

## 2023-06-29 DIAGNOSIS — F41.1 GENERALIZED ANXIETY DISORDER WITH PANIC ATTACKS: ICD-10-CM

## 2023-06-29 DIAGNOSIS — J45.40 MODERATE PERSISTENT ASTHMA WITHOUT COMPLICATION: ICD-10-CM

## 2023-06-29 DIAGNOSIS — F41.0 GENERALIZED ANXIETY DISORDER WITH PANIC ATTACKS: ICD-10-CM

## 2023-06-29 DIAGNOSIS — Z13.6 SCREENING, ISCHEMIC HEART DISEASE: ICD-10-CM

## 2023-06-29 LAB
ALBUMIN SERPL-MCNC: 4.4 G/DL (ref 3.4–5)
ALBUMIN/GLOB SERPL: 1.1 {RATIO} (ref 1–2)
ALP LIVER SERPL-CCNC: 96 U/L
ALT SERPL-CCNC: 53 U/L
ANION GAP SERPL CALC-SCNC: 3 MMOL/L (ref 0–18)
AST SERPL-CCNC: 33 U/L (ref 15–37)
BILIRUB SERPL-MCNC: 0.6 MG/DL (ref 0.1–2)
BUN BLD-MCNC: 16 MG/DL (ref 7–18)
CALCIUM BLD-MCNC: 9.7 MG/DL (ref 8.5–10.1)
CHLORIDE SERPL-SCNC: 107 MMOL/L (ref 98–112)
CHOLEST SERPL-MCNC: 208 MG/DL (ref ?–200)
CO2 SERPL-SCNC: 24 MMOL/L (ref 21–32)
CREAT BLD-MCNC: 0.83 MG/DL
ERYTHROCYTE [DISTWIDTH] IN BLOOD BY AUTOMATED COUNT: 12.2 %
EST. AVERAGE GLUCOSE BLD GHB EST-MCNC: 111 MG/DL (ref 68–126)
FASTING PATIENT LIPID ANSWER: NO
FASTING STATUS PATIENT QL REPORTED: NO
GFR SERPLBLD BASED ON 1.73 SQ M-ARVRAT: 114 ML/MIN/1.73M2 (ref 60–?)
GLOBULIN PLAS-MCNC: 3.9 G/DL (ref 2.8–4.4)
GLUCOSE BLD-MCNC: 94 MG/DL (ref 70–99)
HBA1C MFR BLD: 5.5 % (ref ?–5.7)
HCT VFR BLD AUTO: 48.8 %
HDLC SERPL-MCNC: 46 MG/DL (ref 40–59)
HGB BLD-MCNC: 16.9 G/DL
LDLC SERPL CALC-MCNC: 121 MG/DL (ref ?–100)
MCH RBC QN AUTO: 31.5 PG (ref 26–34)
MCHC RBC AUTO-ENTMCNC: 34.6 G/DL (ref 31–37)
MCV RBC AUTO: 91 FL
NONHDLC SERPL-MCNC: 162 MG/DL (ref ?–130)
OSMOLALITY SERPL CALC.SUM OF ELEC: 279 MOSM/KG (ref 275–295)
PLATELET # BLD AUTO: 326 10(3)UL (ref 150–450)
POTASSIUM SERPL-SCNC: 4.2 MMOL/L (ref 3.5–5.1)
PROT SERPL-MCNC: 8.3 G/DL (ref 6.4–8.2)
RBC # BLD AUTO: 5.36 X10(6)UL
SODIUM SERPL-SCNC: 134 MMOL/L (ref 136–145)
TRIGL SERPL-MCNC: 236 MG/DL (ref 30–149)
TSI SER-ACNC: 0.94 MIU/ML (ref 0.36–3.74)
VIT D+METAB SERPL-MCNC: 31.5 NG/ML (ref 30–100)
VLDLC SERPL CALC-MCNC: 42 MG/DL (ref 0–30)
WBC # BLD AUTO: 9.8 X10(3) UL (ref 4–11)

## 2023-06-29 PROCEDURE — 80053 COMPREHEN METABOLIC PANEL: CPT | Performed by: INTERNAL MEDICINE

## 2023-06-29 PROCEDURE — 83036 HEMOGLOBIN GLYCOSYLATED A1C: CPT | Performed by: INTERNAL MEDICINE

## 2023-06-29 PROCEDURE — 99395 PREV VISIT EST AGE 18-39: CPT | Performed by: INTERNAL MEDICINE

## 2023-06-29 PROCEDURE — 85027 COMPLETE CBC AUTOMATED: CPT | Performed by: INTERNAL MEDICINE

## 2023-06-29 PROCEDURE — 80061 LIPID PANEL: CPT | Performed by: INTERNAL MEDICINE

## 2023-06-29 PROCEDURE — 36415 COLL VENOUS BLD VENIPUNCTURE: CPT | Performed by: INTERNAL MEDICINE

## 2023-06-29 PROCEDURE — 82306 VITAMIN D 25 HYDROXY: CPT | Performed by: INTERNAL MEDICINE

## 2023-06-29 PROCEDURE — 84443 ASSAY THYROID STIM HORMONE: CPT | Performed by: INTERNAL MEDICINE

## 2023-06-29 PROCEDURE — 3075F SYST BP GE 130 - 139MM HG: CPT | Performed by: INTERNAL MEDICINE

## 2023-06-29 PROCEDURE — 3008F BODY MASS INDEX DOCD: CPT | Performed by: INTERNAL MEDICINE

## 2023-06-29 PROCEDURE — 3079F DIAST BP 80-89 MM HG: CPT | Performed by: INTERNAL MEDICINE

## 2023-06-29 RX ORDER — FLUTICASONE PROPIONATE AND SALMETEROL XINAFOATE 115; 21 UG/1; UG/1
2 AEROSOL, METERED RESPIRATORY (INHALATION) 2 TIMES DAILY
Qty: 3 EACH | Refills: 3 | Status: SHIPPED | OUTPATIENT
Start: 2023-06-29

## 2023-06-29 RX ORDER — AMLODIPINE BESYLATE 10 MG/1
10 TABLET ORAL DAILY
Qty: 90 TABLET | Refills: 9 | Status: SHIPPED | OUTPATIENT
Start: 2023-06-29

## 2023-06-29 RX ORDER — ALBUTEROL SULFATE 90 UG/1
2 AEROSOL, METERED RESPIRATORY (INHALATION) EVERY 6 HOURS PRN
Qty: 3 EACH | Refills: 3 | Status: SHIPPED | OUTPATIENT
Start: 2023-06-29

## 2023-06-29 RX ORDER — SPIRONOLACTONE 25 MG/1
12.5 TABLET ORAL DAILY
Qty: 90 TABLET | Refills: 9 | Status: SHIPPED | OUTPATIENT
Start: 2023-06-29

## 2023-06-29 RX ORDER — TELMISARTAN 80 MG/1
80 TABLET ORAL NIGHTLY
Qty: 90 TABLET | Refills: 9 | Status: SHIPPED | OUTPATIENT
Start: 2023-06-29

## 2023-06-29 RX ORDER — ESCITALOPRAM OXALATE 20 MG/1
20 TABLET ORAL DAILY
Qty: 90 TABLET | Refills: 9 | Status: SHIPPED | OUTPATIENT
Start: 2023-06-29

## 2023-06-29 RX ORDER — METHYLPREDNISOLONE 4 MG/1
TABLET ORAL
Qty: 1 EACH | Refills: 0 | Status: SHIPPED | OUTPATIENT
Start: 2023-06-29

## 2023-07-17 DIAGNOSIS — F41.1 GENERALIZED ANXIETY DISORDER WITH PANIC ATTACKS: ICD-10-CM

## 2023-07-17 DIAGNOSIS — Z23 IMMUNIZATION DUE: ICD-10-CM

## 2023-07-17 DIAGNOSIS — F41.0 GENERALIZED ANXIETY DISORDER WITH PANIC ATTACKS: ICD-10-CM

## 2023-07-18 RX ORDER — ALPRAZOLAM 1 MG/1
1 TABLET ORAL DAILY PRN
Qty: 30 TABLET | Refills: 0 | OUTPATIENT
Start: 2023-07-18

## 2023-07-18 RX ORDER — ALPRAZOLAM 1 MG/1
1 TABLET ORAL DAILY PRN
Qty: 30 TABLET | Refills: 0 | Status: SHIPPED | OUTPATIENT
Start: 2023-07-18

## 2023-07-18 NOTE — TELEPHONE ENCOUNTER
Review pended refill request as it does not fall under a protocol. Last Rx:6/20/23    Requested Prescriptions   Pending Prescriptions Disp Refills    ALPRAZOLAM 1 MG Oral Tab [Pharmacy Med Name: ALPRAZOLAM 1 MG TABLET] 30 tablet 0     Sig: TAKE 1 TABLET (1 MG TOTAL) BY MOUTH DAILY AS NEEDED (PANIC ATTACK).        There is no refill protocol information for this order

## 2023-08-15 DIAGNOSIS — F41.1 GENERALIZED ANXIETY DISORDER WITH PANIC ATTACKS: ICD-10-CM

## 2023-08-15 DIAGNOSIS — Z23 IMMUNIZATION DUE: ICD-10-CM

## 2023-08-15 DIAGNOSIS — F41.0 GENERALIZED ANXIETY DISORDER WITH PANIC ATTACKS: ICD-10-CM

## 2023-08-16 RX ORDER — ALPRAZOLAM 1 MG/1
1 TABLET ORAL DAILY PRN
Qty: 30 TABLET | Refills: 0 | Status: SHIPPED | OUTPATIENT
Start: 2023-08-16

## 2023-08-16 NOTE — TELEPHONE ENCOUNTER
Please review; protocol failed. Requested Prescriptions   Pending Prescriptions Disp Refills    ALPRAZolam 1 MG Oral Tab 30 tablet 0     Sig: Take 1 tablet (1 mg total) by mouth daily as needed (panic attack).        There is no refill protocol information for this order        Future Appointments         Provider Department Appt Notes    In 4 months MD Celina Redmond, Arnoldo  chronic care fu          Recent Outpatient Visits              1 month ago Annual physical exam    Julie Chard, Dorrie Hodgkin, MD    Office Visit    6 months ago Hypertension goal BP (blood pressure) < 130/80    Julie Chard, Dorrie Hodgkin, MD    Office Visit    1 year ago Moderate persistent asthma with acute exacerbation    Julie Chard, Dorrie Hodgkin, MD    Office Visit    1 year ago Annual physical exam    Julie Chard, Dorrie Hodgkin, MD    Office Visit    1 year ago Hypertension goal BP (blood pressure) < 130/80    Julie Chard, Dorrie Hodgkin, MD    Office Visit

## 2023-09-12 DIAGNOSIS — F41.1 GENERALIZED ANXIETY DISORDER WITH PANIC ATTACKS: ICD-10-CM

## 2023-09-12 DIAGNOSIS — Z23 IMMUNIZATION DUE: ICD-10-CM

## 2023-09-12 DIAGNOSIS — I10 HYPERTENSION GOAL BP (BLOOD PRESSURE) < 130/80: ICD-10-CM

## 2023-09-12 DIAGNOSIS — F41.0 GENERALIZED ANXIETY DISORDER WITH PANIC ATTACKS: ICD-10-CM

## 2023-09-12 RX ORDER — TELMISARTAN 80 MG/1
80 TABLET ORAL NIGHTLY
Qty: 90 TABLET | Refills: 9 | Status: CANCELLED | OUTPATIENT
Start: 2023-09-12

## 2023-09-13 RX ORDER — ALPRAZOLAM 1 MG/1
1 TABLET ORAL DAILY PRN
Qty: 30 TABLET | Refills: 0 | Status: SHIPPED | OUTPATIENT
Start: 2023-09-13

## 2023-09-29 ENCOUNTER — PATIENT MESSAGE (OUTPATIENT)
Dept: INTERNAL MEDICINE CLINIC | Facility: CLINIC | Age: 38
End: 2023-09-29

## 2023-09-29 NOTE — TELEPHONE ENCOUNTER
[employer] message sent with instructions per protocol. From: Darius Grant III  To: Fili Anna  Sent: 9/29/2023  5:08 PM CDT  Subject: Medical advice    My ankles are getting swollen.

## 2023-09-30 NOTE — TELEPHONE ENCOUNTER
[employer] message with MD recommendation sent to pt. Future Appointments   Date Time Provider Valentino Migdalia   10/1/2023  1:00 PM [facility] CT [facility].  [facility] [place]   12/29/2023  8:00 AM MD EDEL Merrill [facility]

## 2023-09-30 NOTE — TELEPHONE ENCOUNTER
DR Zachariah Foley =HARSHIL. Future Appointments   Date Time Provider Valentino Migdalia   10/1/2023  1:00 PM [facility] CT [facility].  [facility] [place]   12/29/2023  8:00 AM MD EDEL Covarrubias AD[facility]

## 2023-10-16 DIAGNOSIS — Z23 IMMUNIZATION DUE: ICD-10-CM

## 2023-10-16 DIAGNOSIS — F41.0 GENERALIZED ANXIETY DISORDER WITH PANIC ATTACKS: ICD-10-CM

## 2023-10-16 DIAGNOSIS — F41.1 GENERALIZED ANXIETY DISORDER WITH PANIC ATTACKS: ICD-10-CM

## 2023-10-17 RX ORDER — ALPRAZOLAM 1 MG/1
1 TABLET ORAL DAILY PRN
Qty: 30 TABLET | Refills: 0 | Status: SHIPPED | OUTPATIENT
Start: 2023-10-17

## 2023-10-17 NOTE — TELEPHONE ENCOUNTER
Please review; protocol failed. Requested Prescriptions   Pending Prescriptions Disp Refills    ALPRAZolam 1 MG Oral Tab 30 tablet 0     Sig: Take 1 tablet (1 mg total) by mouth daily as needed (panic attack).        There is no refill protocol information for this order        Recent Outpatient Visits              3 months ago Annual physical exam    Yomaira Connor MD    Office Visit    8 months ago Hypertension goal BP (blood pressure) < 130/80    [address] MD    Office Visit    1 year ago Moderate persistent asthma with acute exacerbation    [address] MD    Office Visit    1 year ago Annual physical exam    [address] [place]    Office Visit    1 year ago Hypertension goal BP (blood pressure) < 130/80    [address] MD    Office Visit          Future Appointments         Provider Department Appt Notes    In 2 months Guido Kaur MD [id number], [facility] [address], [address] chronic care fu

## 2023-11-01 ENCOUNTER — ORDER TRANSCRIPTION (OUTPATIENT)
Dept: ADMINISTRATIVE | Facility: HOSPITAL | Age: 38
End: 2023-11-01

## 2023-11-01 DIAGNOSIS — Z13.6 SCREENING FOR CARDIOVASCULAR CONDITION: Primary | ICD-10-CM

## 2023-11-15 DIAGNOSIS — Z23 IMMUNIZATION DUE: ICD-10-CM

## 2023-11-15 DIAGNOSIS — F41.0 GENERALIZED ANXIETY DISORDER WITH PANIC ATTACKS: ICD-10-CM

## 2023-11-15 DIAGNOSIS — F41.1 GENERALIZED ANXIETY DISORDER WITH PANIC ATTACKS: ICD-10-CM

## 2023-11-16 RX ORDER — ALPRAZOLAM 1 MG/1
1 TABLET ORAL DAILY PRN
Qty: 30 TABLET | Refills: 0 | Status: SHIPPED | OUTPATIENT
Start: 2023-11-16

## 2023-11-16 NOTE — TELEPHONE ENCOUNTER
Please review. Protocol failed or has no protocol. Requested Prescriptions   Pending Prescriptions Disp Refills    ALPRAZolam 1 MG Oral Tab 30 tablet 0     Sig: Take 1 tablet (1 mg total) by mouth daily as needed (panic attack).        There is no refill protocol information for this order          Recent Outpatient Visits              4 months ago Annual physical exam    Supriya Delgadillo MD    Office Visit    9 months ago Hypertension goal BP (blood pressure) < 130/80    Supriya Delgadillo MD    Office Visit    1 year ago Moderate persistent asthma with acute exacerbation    Supriya Delgadillo MD    Office Visit    1 year ago Annual physical exam    Supriya Delgadillo MD    Office Visit    1 year ago Hypertension goal BP (blood pressure) < 130/80    Supriya Delgadillo MD    Office Visit            Future Appointments         Provider Department Appt Notes    In 1 month [address] 49.00    In 1 month Janis Limon MD [address], [facility] [address], [address] chronic care fu

## 2023-12-17 ENCOUNTER — HOSPITAL ENCOUNTER (OUTPATIENT)
Dept: CT IMAGING | Age: 38
Discharge: HOME | End: 2023-12-17
Attending: INTERNAL MEDICINE

## 2023-12-17 DIAGNOSIS — Z13.6 SCREENING FOR CARDIOVASCULAR CONDITION: ICD-10-CM

## 2023-12-17 DIAGNOSIS — F41.1 GENERALIZED ANXIETY DISORDER WITH PANIC ATTACKS: ICD-10-CM

## 2023-12-17 DIAGNOSIS — F41.0 GENERALIZED ANXIETY DISORDER WITH PANIC ATTACKS: ICD-10-CM

## 2023-12-17 DIAGNOSIS — Z23 IMMUNIZATION DUE: ICD-10-CM

## 2023-12-17 DIAGNOSIS — Z13.6 SCREENING, ISCHEMIC HEART DISEASE: ICD-10-CM

## 2023-12-17 NOTE — TELEPHONE ENCOUNTER
Please review; protocol failed. Requested Prescriptions   Pending Prescriptions Disp Refills    ALPRAZOLAM 1 MG Oral Tab [Pharmacy Med Name: ALPRAZOLAM 1 MG TABLET] 30 tablet 0     Sig: Take 1 tablet (1 mg total) by mouth daily as needed (panic attack).        There is no refill protocol information for this order          Future Appointments         Provider Department Appt Notes    In 1 week MD David Covarrubias, [facility] [address], [address] chronic care fu            Recent Outpatient Visits              5 months ago Annual physical exam    Larry Marshall MD    Office Visit    10 months ago Hypertension goal BP (blood pressure) < 130/80    Katia Phillips MD    Office Visit    1 year ago Moderate persistent asthma with acute exacerbation    Katia Phillips MD    Office Visit    1 year ago Annual physical exam    Katia Phillips MD    Office Visit    1 year ago Hypertension goal BP (blood pressure) < 130/80    Katia Phillips MD    Office Visit

## 2023-12-18 DIAGNOSIS — F41.1 GENERALIZED ANXIETY DISORDER WITH PANIC ATTACKS: ICD-10-CM

## 2023-12-18 DIAGNOSIS — F41.0 GENERALIZED ANXIETY DISORDER WITH PANIC ATTACKS: ICD-10-CM

## 2023-12-18 DIAGNOSIS — Z23 IMMUNIZATION DUE: ICD-10-CM

## 2023-12-18 RX ORDER — ALPRAZOLAM 1 MG/1
1 TABLET ORAL DAILY PRN
Qty: 30 TABLET | Refills: 0 | Status: SHIPPED | OUTPATIENT
Start: 2023-12-18

## 2023-12-19 RX ORDER — ALPRAZOLAM 1 MG/1
1 TABLET ORAL DAILY PRN
Qty: 30 TABLET | Refills: 0 | OUTPATIENT
Start: 2023-12-19

## 2023-12-29 ENCOUNTER — OFFICE VISIT (OUTPATIENT)
Facility: LOCATION | Age: 38
End: 2023-12-29

## 2023-12-29 ENCOUNTER — LAB ENCOUNTER (OUTPATIENT)
Dept: LAB | Age: 38
End: 2023-12-29
Attending: INTERNAL MEDICINE
Payer: COMMERCIAL

## 2023-12-29 VITALS
BODY MASS INDEX: 21.48 KG/M2 | OXYGEN SATURATION: 96 % | HEIGHT: 69 IN | HEART RATE: 87 BPM | WEIGHT: 145 LBS | SYSTOLIC BLOOD PRESSURE: 128 MMHG | DIASTOLIC BLOOD PRESSURE: 84 MMHG

## 2023-12-29 DIAGNOSIS — I25.118 CORONARY ARTERY DISEASE INVOLVING NATIVE CORONARY ARTERY OF NATIVE HEART WITH OTHER FORM OF ANGINA PECTORIS (HCC): ICD-10-CM

## 2023-12-29 DIAGNOSIS — I10 HYPERTENSION GOAL BP (BLOOD PRESSURE) < 130/80: ICD-10-CM

## 2023-12-29 DIAGNOSIS — F41.0 GENERALIZED ANXIETY DISORDER WITH PANIC ATTACKS: ICD-10-CM

## 2023-12-29 DIAGNOSIS — J45.40 MODERATE PERSISTENT ASTHMA WITHOUT COMPLICATION: Primary | ICD-10-CM

## 2023-12-29 DIAGNOSIS — F41.1 GENERALIZED ANXIETY DISORDER WITH PANIC ATTACKS: ICD-10-CM

## 2023-12-29 DIAGNOSIS — R93.1 ELEVATED CORONARY ARTERY CALCIUM SCORE: ICD-10-CM

## 2023-12-29 PROBLEM — I25.10 CORONARY ARTERY DISEASE: Status: ACTIVE | Noted: 2023-12-29

## 2023-12-29 LAB
ALBUMIN SERPL-MCNC: 5 G/DL (ref 3.2–4.8)
ALBUMIN/GLOB SERPL: 1.8 {RATIO} (ref 1–2)
ALP LIVER SERPL-CCNC: 89 U/L
ALT SERPL-CCNC: 34 U/L
ANION GAP SERPL CALC-SCNC: 0 MMOL/L (ref 0–18)
AST SERPL-CCNC: 21 U/L (ref ?–34)
BILIRUB SERPL-MCNC: 0.5 MG/DL (ref 0.3–1.2)
BUN BLD-MCNC: 10 MG/DL (ref 9–23)
BUN/CREAT SERPL: 11.5 (ref 10–20)
CALCIUM BLD-MCNC: 10.2 MG/DL (ref 8.7–10.4)
CHLORIDE SERPL-SCNC: 108 MMOL/L (ref 98–112)
CHOLEST SERPL-MCNC: 213 MG/DL (ref ?–200)
CO2 SERPL-SCNC: 29 MMOL/L (ref 21–32)
CREAT BLD-MCNC: 0.87 MG/DL
EGFRCR SERPLBLD CKD-EPI 2021: 112 ML/MIN/1.73M2 (ref 60–?)
FASTING PATIENT LIPID ANSWER: NO
FASTING STATUS PATIENT QL REPORTED: NO
GLOBULIN PLAS-MCNC: 2.8 G/DL (ref 2.8–4.4)
GLUCOSE BLD-MCNC: 97 MG/DL (ref 70–99)
HDLC SERPL-MCNC: 52 MG/DL (ref 40–59)
LDLC SERPL CALC-MCNC: 131 MG/DL (ref ?–100)
NONHDLC SERPL-MCNC: 161 MG/DL (ref ?–130)
OSMOLALITY SERPL CALC.SUM OF ELEC: 283 MOSM/KG (ref 275–295)
POTASSIUM SERPL-SCNC: 4.5 MMOL/L (ref 3.5–5.1)
PROT SERPL-MCNC: 7.8 G/DL (ref 5.7–8.2)
SODIUM SERPL-SCNC: 137 MMOL/L (ref 136–145)
TRIGL SERPL-MCNC: 169 MG/DL (ref 30–149)
VLDLC SERPL CALC-MCNC: 31 MG/DL (ref 0–30)

## 2023-12-29 PROCEDURE — 99214 OFFICE O/P EST MOD 30 MIN: CPT | Performed by: INTERNAL MEDICINE

## 2023-12-29 PROCEDURE — 3074F SYST BP LT 130 MM HG: CPT | Performed by: INTERNAL MEDICINE

## 2023-12-29 PROCEDURE — 36415 COLL VENOUS BLD VENIPUNCTURE: CPT

## 2023-12-29 PROCEDURE — 3079F DIAST BP 80-89 MM HG: CPT | Performed by: INTERNAL MEDICINE

## 2023-12-29 PROCEDURE — 80053 COMPREHEN METABOLIC PANEL: CPT

## 2023-12-29 PROCEDURE — 80061 LIPID PANEL: CPT

## 2023-12-29 PROCEDURE — 3008F BODY MASS INDEX DOCD: CPT | Performed by: INTERNAL MEDICINE

## 2023-12-29 RX ORDER — FLUTICASONE PROPIONATE AND SALMETEROL XINAFOATE 115; 21 UG/1; UG/1
2 AEROSOL, METERED RESPIRATORY (INHALATION) 2 TIMES DAILY
Qty: 3 EACH | Refills: 3 | Status: SHIPPED | OUTPATIENT
Start: 2023-12-29

## 2023-12-29 RX ORDER — ALPRAZOLAM 1 MG/1
1 TABLET ORAL DAILY PRN
Qty: 30 TABLET | Refills: 2 | Status: SHIPPED | OUTPATIENT
Start: 2023-12-29

## 2023-12-29 RX ORDER — AMLODIPINE BESYLATE 10 MG/1
10 TABLET ORAL DAILY
Qty: 90 TABLET | Refills: 9 | Status: SHIPPED | OUTPATIENT
Start: 2023-12-29

## 2023-12-29 RX ORDER — ASPIRIN 81 MG/1
81 TABLET ORAL DAILY
Qty: 90 TABLET | Refills: 9 | Status: SHIPPED | OUTPATIENT
Start: 2023-12-29

## 2023-12-29 RX ORDER — ALBUTEROL SULFATE 90 UG/1
2 AEROSOL, METERED RESPIRATORY (INHALATION) EVERY 6 HOURS PRN
Qty: 3 EACH | Refills: 3 | Status: SHIPPED | OUTPATIENT
Start: 2023-12-29

## 2023-12-29 RX ORDER — ESCITALOPRAM OXALATE 20 MG/1
20 TABLET ORAL DAILY
Qty: 90 TABLET | Refills: 9 | Status: SHIPPED | OUTPATIENT
Start: 2023-12-29

## 2023-12-29 RX ORDER — TELMISARTAN 80 MG/1
80 TABLET ORAL NIGHTLY
Qty: 90 TABLET | Refills: 9 | Status: SHIPPED | OUTPATIENT
Start: 2023-12-29

## 2023-12-29 RX ORDER — ROSUVASTATIN CALCIUM 10 MG/1
10 TABLET, COATED ORAL NIGHTLY
Qty: 90 TABLET | Refills: 9 | Status: SHIPPED | OUTPATIENT
Start: 2023-12-29

## 2023-12-29 NOTE — PATIENT INSTRUCTIONS
Elsa Chavira currently has a slightly elevated heart scan, ie calcium score, this means there is just the beginning or possible beginnings of heart disease, this has a lot to do with genetics. Therefor we will start a cholesteorl medication, aspirin 81mg to thin the blood, I will have him get a stress echocardiogram and we will have him establish care with our cardiology service to ensure we are as aggressive as possible. This is not a dangerous situation nor will this require any stenting but because of the age and the genetics I would like to be more aggressive and prevent complications . than treat them. Please let me know if you have any questions or more than happy to answer them! Carmelo Rico told me that you might be interested in some changes in your treatment, I am more than happy to assist you with this. I know seeing a new doctor is difficult therefor the main goal would be to discuss your current treatment/medications and how they are affecting you as well as to find out if any of these can/should be modified to something better. Even if recommendations are made by me, you do NOT have to follow through with them, this is entirely up to you!  If you'd like to come and see me please set up a 40 minute visit (let them know Dr Chadwick Check said so, they can reach out to me as well) and birng all your medication bottles so I Can look at them. -charlotte

## 2024-02-12 DIAGNOSIS — F41.0 GENERALIZED ANXIETY DISORDER WITH PANIC ATTACKS: ICD-10-CM

## 2024-02-12 DIAGNOSIS — F41.1 GENERALIZED ANXIETY DISORDER WITH PANIC ATTACKS: ICD-10-CM

## 2024-02-13 RX ORDER — ALPRAZOLAM 1 MG/1
1 TABLET ORAL DAILY PRN
Qty: 30 TABLET | Refills: 2 | OUTPATIENT
Start: 2024-02-13

## 2024-03-11 DIAGNOSIS — F41.1 GENERALIZED ANXIETY DISORDER WITH PANIC ATTACKS: ICD-10-CM

## 2024-03-11 DIAGNOSIS — F41.0 GENERALIZED ANXIETY DISORDER WITH PANIC ATTACKS: ICD-10-CM

## 2024-03-12 NOTE — TELEPHONE ENCOUNTER
Please review; protocol failed/ has no protocol    Requested Prescriptions   Pending Prescriptions Disp Refills    ALPRAZolam 1 MG Oral Tab 30 tablet 2     Sig: Take 1 tablet (1 mg total) by mouth daily as needed (panic attack).       Controlled Substance Medication Failed - 3/11/2024 11:00 AM        Failed - This medication is a controlled substance - forward to provider to refill           Recent Outpatient Visits              2 months ago Moderate persistent asthma without complication (HCC)    [facility], [facility] Satnam Lopez MD    Office Visit    8 months ago Annual physical exam    [facility], Satnam Armando MD    Office Visit    1 year ago Hypertension goal BP (blood pressure) < 130/80    [facility] [place] Cel[place] Krunal, MD    Office Visit    1 year ago Moderate persistent asthma with acute exacerbation (HCC)    [facility] [place] Cel[place] Krunal, MD    Office Visit    1 year ago Annual physical exam    [facility] [place] Cel[place] Krunal, MD    Office Visit

## 2024-03-13 RX ORDER — ALPRAZOLAM 1 MG/1
1 TABLET ORAL DAILY PRN
Qty: 30 TABLET | Refills: 2 | Status: SHIPPED | OUTPATIENT
Start: 2024-03-13

## 2024-07-02 DIAGNOSIS — F41.0 GENERALIZED ANXIETY DISORDER WITH PANIC ATTACKS: ICD-10-CM

## 2024-07-02 DIAGNOSIS — F41.1 GENERALIZED ANXIETY DISORDER WITH PANIC ATTACKS: ICD-10-CM

## 2024-07-04 NOTE — TELEPHONE ENCOUNTER
Please review. Protocol Failed; No Protocol      Recent fills: 4/7/2024, 5/5/2024, 6/1/2024  Last Rx written: 3/13/2024  Last office visit: 12/29/2023          Requested Prescriptions   Pending Prescriptions Disp Refills    ALPRAZOLAM 1 MG Oral Tab [Pharmacy Med Name: ALPRAZOLAM 1 MG TABLET] 30 tablet 2     Sig: Take 1 tablet (1 mg total) by mouth daily as needed (panic attack).       Controlled Substance Medication Failed - 7/2/2024  4:59 PM        Failed - This medication is a controlled substance - forward to provider to refill                 Recent Outpatient Visits              6 months ago Moderate persistent asthma without complication (HCC)    [facility], [facility] Satnam Lopez MD    Office Visit    1 year ago Annual physical exam    [facility] [place], Satnam Armando MD    Office Visit    1 year ago Hypertension goal BP (blood pressure) < 130/80    [facility] [place] Cel[place] Krunal, MD    Office Visit    1 year ago Moderate persistent asthma with acute exacerbation (HCC)    [facility]    Office Visit    2 years ago Annual physical exam    [facility] [place] Celeste Krunal, MD    Office Visit

## 2024-07-05 RX ORDER — ALPRAZOLAM 1 MG/1
1 TABLET ORAL DAILY PRN
Qty: 30 TABLET | Refills: 2 | Status: SHIPPED | OUTPATIENT
Start: 2024-07-05

## 2024-08-30 DIAGNOSIS — I25.118 CORONARY ARTERY DISEASE INVOLVING NATIVE CORONARY ARTERY OF NATIVE HEART WITH OTHER FORM OF ANGINA PECTORIS (HCC): ICD-10-CM

## 2024-08-30 DIAGNOSIS — F41.0 GENERALIZED ANXIETY DISORDER WITH PANIC ATTACKS: ICD-10-CM

## 2024-08-30 DIAGNOSIS — F41.1 GENERALIZED ANXIETY DISORDER WITH PANIC ATTACKS: ICD-10-CM

## 2024-09-03 RX ORDER — ASPIRIN 81 MG/1
81 TABLET ORAL DAILY
Qty: 90 TABLET | Refills: 9 | OUTPATIENT
Start: 2024-09-03

## 2024-09-03 NOTE — TELEPHONE ENCOUNTER
Please review; protocol failed    No future appointments.  LAST OFFICE VISIT: 12/29/2023    Please advise, per dispense history from 3/11/24: patient should have enough medication until 9/8/2024  Recent fill dates: 3/11/24, 4/7/24, 5/5/24, 6/1/24, 7/5/24 and 8/1/24     For qty of: #30 each for a 30 day supply  Date of  last written prescription:      Date: 7/5/2024    Requested Prescriptions   Pending Prescriptions Disp Refills    ALPRAZolam 1 MG Oral Tab 30 tablet 2     Sig: Take 1 tablet (1 mg total) by mouth daily as needed (panic attack).       Controlled Substance Medication Failed - 8/30/2024  3:38 PM        Failed - This medication is a controlled substance - forward to provider to refill         Refused Prescriptions Disp Refills    aspirin ( ASPIRIN) 81 MG Oral Tab EC 90 tablet 9     Sig: Take 1 tablet (81 mg total) by mouth daily. FOR HEART.       Aspirin Protocol Failed - 8/30/2024  3:38 PM        Failed - In person appointment or virtual visit in the past 6 mos or appointment in next 3 mos     Recent Outpatient Visits              8 months ago Moderate persistent asthma without complication (HCC)    [facility], [facility] Satnam Lopez MD    Office Visit    1 year ago Annual physical exam    [facility], Satnam Armando MD    Office Visit    1 year ago Hypertension goal BP (blood pressure) < 130/80    [facility]    Office Visit    2 years ago Moderate persistent asthma with acute exacerbation (HCC)    [facility], Satnam Armando MD    Office Visit    2 years ago Annual physical exam    [facility]    [facility]                             Recent Outpatient Visits              8 months ago Moderate persistent asthma without complication (HCC)    [place]  [facility], [facility] Satnam Lopez MD    Office Visit    1 year ago Annual physical exam    [facility], [facility], Satnam Armando MD    Office Visit    1 year ago Hypertension goal BP (blood pressure) < 130/80    [facility], [facility], Satnam Armando MD    Office Visit    2 years ago Moderate persistent asthma with acute exacerbation (HCC)    [facility], Satnam Armando MD    Office Visit    2 years ago Annual physical exam    [facility], Satnam Armando MD    Office Visit

## 2024-09-05 RX ORDER — ALPRAZOLAM 1 MG
1 TABLET ORAL DAILY PRN
Qty: 30 TABLET | Refills: 2 | Status: SHIPPED | OUTPATIENT
Start: 2024-09-05

## 2024-12-23 DIAGNOSIS — F41.1 GENERALIZED ANXIETY DISORDER WITH PANIC ATTACKS: ICD-10-CM

## 2024-12-23 DIAGNOSIS — F41.0 GENERALIZED ANXIETY DISORDER WITH PANIC ATTACKS: ICD-10-CM

## 2024-12-27 ENCOUNTER — TELEPHONE (OUTPATIENT)
Facility: LOCATION | Age: 39
End: 2024-12-27

## 2024-12-27 RX ORDER — ALPRAZOLAM 1 MG/1
1 TABLET ORAL DAILY PRN
Qty: 30 TABLET | Refills: 2 | OUTPATIENT
Start: 2024-12-27

## 2024-12-27 RX ORDER — ALPRAZOLAM 1 MG/1
1 TABLET ORAL DAILY PRN
Qty: 30 TABLET | Refills: 2 | Status: SHIPPED | OUTPATIENT
Start: 2024-12-27

## 2024-12-27 NOTE — TELEPHONE ENCOUNTER
Duplicate request - see other encounter for refill on 12/23/24 - this has been forwarded to Dr. Lopez.     Also see TE dated 12/27/24 - RN informed patient one refill will be denied as duplicate.   Patient verbalized understanding.

## 2024-12-27 NOTE — TELEPHONE ENCOUNTER
Dr. Lopez - HARSHIL - RN will send refill encounter to you next    Spoke to patient, full name and date of birth verified.  Patient calling about refills sent (2 encounters) on 12/23/24 for his alprazolam.  RN explained to patient that one will need to be denied as duplicate - he can ignore the [employer] message that will be sent.      Patient informed that his last visit was on 12/29/23 and Dr. Lopez wanted to see him in June for his complete physical.     Patient stated office staff usually schedule him, he did not realize he was this overdue.    Patient scheduled physical today with RN, for 1/9/25 at 8:20 AM.     Patient stated he has enough medications until 1/9/25 except for the alprazolam (he is completely out).     Last alprazolam 1 mg refills:  11/27/24 quantity 30, 30 day supply  10/28/24 quantity 30, 30 day supply  9/29/24 quantity 30, 30 day supply  8/31/24 quantity 30, 30 day supply  8/1/24 quantity 30, 30 day supply  7/5/24 quantity 30, 30 day supply

## 2024-12-27 NOTE — TELEPHONE ENCOUNTER
Spoke to patient, full name and date of birth verified.  Informed patient Dr. Lopez sent in refill of alprazolam for him.   Patient will keep his appointment for physical on 1/9/25.

## 2024-12-27 NOTE — TELEPHONE ENCOUNTER
Please see phone encounter dated 12/27/24 - patient called the office.  Requests 30-day supply to hold him until he can see Dr. Lopez on 1/9/25.

## 2025-01-09 ENCOUNTER — OFFICE VISIT (OUTPATIENT)
Facility: LOCATION | Age: 40
End: 2025-01-09
Payer: COMMERCIAL

## 2025-01-09 ENCOUNTER — LAB ENCOUNTER (OUTPATIENT)
Dept: LAB | Age: 40
End: 2025-01-09
Attending: INTERNAL MEDICINE
Payer: COMMERCIAL

## 2025-01-09 VITALS
BODY MASS INDEX: 37.15 KG/M2 | HEIGHT: 68.5 IN | DIASTOLIC BLOOD PRESSURE: 82 MMHG | WEIGHT: 248 LBS | HEART RATE: 99 BPM | SYSTOLIC BLOOD PRESSURE: 133 MMHG | OXYGEN SATURATION: 96 %

## 2025-01-09 DIAGNOSIS — I25.118 CORONARY ARTERY DISEASE INVOLVING NATIVE CORONARY ARTERY OF NATIVE HEART WITH OTHER FORM OF ANGINA PECTORIS (HCC): ICD-10-CM

## 2025-01-09 DIAGNOSIS — E55.9 VITAMIN D DEFICIENCY: ICD-10-CM

## 2025-01-09 DIAGNOSIS — Z23 ENCOUNTER FOR IMMUNIZATION: ICD-10-CM

## 2025-01-09 DIAGNOSIS — Z13.29 SCREENING FOR ENDOCRINE, NUTRITIONAL, METABOLIC AND IMMUNITY DISORDER: ICD-10-CM

## 2025-01-09 DIAGNOSIS — I10 HYPERTENSION GOAL BP (BLOOD PRESSURE) < 130/80: ICD-10-CM

## 2025-01-09 DIAGNOSIS — M54.12 CERVICAL RADICULITIS: ICD-10-CM

## 2025-01-09 DIAGNOSIS — F41.1 GENERALIZED ANXIETY DISORDER WITH PANIC ATTACKS: ICD-10-CM

## 2025-01-09 DIAGNOSIS — Z13.6 ENCOUNTER FOR SCREENING FOR CORONARY ARTERY DISEASE: ICD-10-CM

## 2025-01-09 DIAGNOSIS — Z13.21 SCREENING FOR ENDOCRINE, NUTRITIONAL, METABOLIC AND IMMUNITY DISORDER: ICD-10-CM

## 2025-01-09 DIAGNOSIS — Z13.0 SCREENING FOR ENDOCRINE, NUTRITIONAL, METABOLIC AND IMMUNITY DISORDER: ICD-10-CM

## 2025-01-09 DIAGNOSIS — Z13.228 SCREENING FOR ENDOCRINE, NUTRITIONAL, METABOLIC AND IMMUNITY DISORDER: ICD-10-CM

## 2025-01-09 DIAGNOSIS — M48.062 NEUROGENIC CLAUDICATION DUE TO LUMBAR SPINAL STENOSIS: ICD-10-CM

## 2025-01-09 DIAGNOSIS — Z12.5 ENCOUNTER FOR SCREENING FOR MALIGNANT NEOPLASM OF PROSTATE: ICD-10-CM

## 2025-01-09 DIAGNOSIS — Z00.00 ANNUAL PHYSICAL EXAM: Primary | ICD-10-CM

## 2025-01-09 DIAGNOSIS — F41.0 GENERALIZED ANXIETY DISORDER WITH PANIC ATTACKS: ICD-10-CM

## 2025-01-09 LAB
ALBUMIN SERPL-MCNC: 5 G/DL (ref 3.2–4.8)
ALBUMIN/GLOB SERPL: 2 {RATIO} (ref 1–2)
ALP LIVER SERPL-CCNC: 88 U/L
ALT SERPL-CCNC: 36 U/L
ANION GAP SERPL CALC-SCNC: 7 MMOL/L (ref 0–18)
AST SERPL-CCNC: 28 U/L (ref ?–34)
BILIRUB SERPL-MCNC: 0.9 MG/DL (ref 0.3–1.2)
BUN BLD-MCNC: 14 MG/DL (ref 9–23)
BUN/CREAT SERPL: 13.9 (ref 10–20)
CALCIUM BLD-MCNC: 10.1 MG/DL (ref 8.7–10.4)
CHLORIDE SERPL-SCNC: 103 MMOL/L (ref 98–112)
CHOLEST SERPL-MCNC: 140 MG/DL (ref ?–200)
CO2 SERPL-SCNC: 24 MMOL/L (ref 21–32)
COMPLEXED PSA SERPL-MCNC: 0.15 NG/ML (ref ?–4)
CREAT BLD-MCNC: 1.01 MG/DL
DEPRECATED RDW RBC AUTO: 43.3 FL (ref 35.1–46.3)
EGFRCR SERPLBLD CKD-EPI 2021: 96 ML/MIN/1.73M2 (ref 60–?)
ERYTHROCYTE [DISTWIDTH] IN BLOOD BY AUTOMATED COUNT: 12.6 % (ref 11–15)
EST. AVERAGE GLUCOSE BLD GHB EST-MCNC: 108 MG/DL (ref 68–126)
FASTING PATIENT LIPID ANSWER: NO
FASTING STATUS PATIENT QL REPORTED: NO
GLOBULIN PLAS-MCNC: 2.5 G/DL (ref 2–3.5)
GLUCOSE BLD-MCNC: 93 MG/DL (ref 70–99)
HBA1C MFR BLD: 5.4 % (ref ?–5.7)
HCT VFR BLD AUTO: 48 %
HDLC SERPL-MCNC: 60 MG/DL (ref 40–59)
HGB BLD-MCNC: 16.1 G/DL
LDLC SERPL CALC-MCNC: 42 MG/DL (ref ?–100)
MCH RBC QN AUTO: 31.3 PG (ref 26–34)
MCHC RBC AUTO-ENTMCNC: 33.5 G/DL (ref 31–37)
MCV RBC AUTO: 93.2 FL
NONHDLC SERPL-MCNC: 80 MG/DL (ref ?–130)
OSMOLALITY SERPL CALC.SUM OF ELEC: 278 MOSM/KG (ref 275–295)
PLATELET # BLD AUTO: 389 10(3)UL (ref 150–450)
POTASSIUM SERPL-SCNC: 4.5 MMOL/L (ref 3.5–5.1)
PROT SERPL-MCNC: 7.5 G/DL (ref 5.7–8.2)
RBC # BLD AUTO: 5.15 X10(6)UL
SODIUM SERPL-SCNC: 134 MMOL/L (ref 136–145)
TRIGL SERPL-MCNC: 246 MG/DL (ref 30–149)
TSI SER-ACNC: 1.25 UIU/ML (ref 0.55–4.78)
VIT D+METAB SERPL-MCNC: 19.9 NG/ML (ref 30–100)
VLDLC SERPL CALC-MCNC: 34 MG/DL (ref 0–30)
WBC # BLD AUTO: 8.5 X10(3) UL (ref 4–11)

## 2025-01-09 PROCEDURE — 90471 IMMUNIZATION ADMIN: CPT | Performed by: INTERNAL MEDICINE

## 2025-01-09 PROCEDURE — 84443 ASSAY THYROID STIM HORMONE: CPT | Performed by: INTERNAL MEDICINE

## 2025-01-09 PROCEDURE — 80053 COMPREHEN METABOLIC PANEL: CPT | Performed by: INTERNAL MEDICINE

## 2025-01-09 PROCEDURE — 80061 LIPID PANEL: CPT | Performed by: INTERNAL MEDICINE

## 2025-01-09 PROCEDURE — 99396 PREV VISIT EST AGE 40-64: CPT | Performed by: INTERNAL MEDICINE

## 2025-01-09 PROCEDURE — 99214 OFFICE O/P EST MOD 30 MIN: CPT | Performed by: INTERNAL MEDICINE

## 2025-01-09 PROCEDURE — 85027 COMPLETE CBC AUTOMATED: CPT | Performed by: INTERNAL MEDICINE

## 2025-01-09 PROCEDURE — 36415 COLL VENOUS BLD VENIPUNCTURE: CPT | Performed by: INTERNAL MEDICINE

## 2025-01-09 PROCEDURE — 82306 VITAMIN D 25 HYDROXY: CPT | Performed by: INTERNAL MEDICINE

## 2025-01-09 PROCEDURE — 83036 HEMOGLOBIN GLYCOSYLATED A1C: CPT | Performed by: INTERNAL MEDICINE

## 2025-01-09 PROCEDURE — 90656 IIV3 VACC NO PRSV 0.5 ML IM: CPT | Performed by: INTERNAL MEDICINE

## 2025-01-09 RX ORDER — AMLODIPINE BESYLATE 10 MG/1
10 TABLET ORAL DAILY
Qty: 90 TABLET | Refills: 9 | Status: SHIPPED | OUTPATIENT
Start: 2025-01-09

## 2025-01-09 RX ORDER — PREGABALIN 50 MG/1
50 CAPSULE ORAL NIGHTLY
Qty: 90 CAPSULE | Refills: 0 | Status: SHIPPED | OUTPATIENT
Start: 2025-01-09

## 2025-01-09 RX ORDER — ASPIRIN 81 MG/1
81 TABLET ORAL DAILY
Qty: 90 TABLET | Refills: 9 | Status: SHIPPED | OUTPATIENT
Start: 2025-01-09

## 2025-01-09 RX ORDER — ESCITALOPRAM OXALATE 20 MG/1
20 TABLET ORAL DAILY
Qty: 90 TABLET | Refills: 9 | Status: SHIPPED | OUTPATIENT
Start: 2025-01-09

## 2025-01-09 RX ORDER — PREDNISONE 20 MG/1
20 TABLET ORAL 2 TIMES DAILY WITH MEALS
Qty: 14 TABLET | Refills: 0 | Status: SHIPPED | OUTPATIENT
Start: 2025-01-09 | End: 2025-01-16

## 2025-01-09 RX ORDER — ROSUVASTATIN CALCIUM 10 MG/1
10 TABLET, COATED ORAL NIGHTLY
Qty: 90 TABLET | Refills: 9 | Status: SHIPPED | OUTPATIENT
Start: 2025-01-09

## 2025-01-09 RX ORDER — TELMISARTAN 80 MG/1
80 TABLET ORAL NIGHTLY
Qty: 90 TABLET | Refills: 9 | Status: SHIPPED | OUTPATIENT
Start: 2025-01-09

## 2025-01-09 NOTE — PATIENT INSTRUCTIONS
I would like for you to get the x-ray of your cervical thoracic and lumbar spine, this is your entire spine and I would like for you to try to set up physical therapy at any location nearest to you.  [facility] or doctors of physical therapy are perfectly fine.  If you cannot do the physical therapy please send me a message explaining why so I can use this for your insurance approval.    I am going to give you prednisone which is an anti-inflammatory, you should take this if your back or leg pain worsens    Because the cyclobenzaprine i.e. Flexeril makes you feel loopy which is expected I would like for us to consider an alternative called Lyrica.  This medication does make people feel little bit drowsy but at very low dosing the goal is to calm down the nerves so that you do not have the burning pain spasming and irritation and this is not an addictive medication, it does not affect the heart, does not affect the kidneys however it is not used as needed and we typically take this nightly and then see how things go.  I start with 50 mg nightly which is the minimum starting dose and then we titrate up over the next 4 to 6 weeks.  The maximum dose is 200 mg every 8 hours but our first goal is to get you some good night of sleep and then see how the rest of the day goes.
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
## 2025-01-21 DIAGNOSIS — I10 HYPERTENSION GOAL BP (BLOOD PRESSURE) < 130/80: ICD-10-CM

## 2025-01-21 DIAGNOSIS — F41.0 GENERALIZED ANXIETY DISORDER WITH PANIC ATTACKS: ICD-10-CM

## 2025-01-21 DIAGNOSIS — F41.1 GENERALIZED ANXIETY DISORDER WITH PANIC ATTACKS: ICD-10-CM

## 2025-01-24 RX ORDER — AMLODIPINE BESYLATE 10 MG/1
10 TABLET ORAL DAILY
Qty: 90 TABLET | Refills: 9 | OUTPATIENT
Start: 2025-01-24

## 2025-01-24 RX ORDER — ALPRAZOLAM 1 MG/1
1 TABLET ORAL DAILY PRN
Qty: 30 TABLET | Refills: 2 | OUTPATIENT
Start: 2025-01-24

## 2025-01-24 NOTE — TELEPHONE ENCOUNTER
Amlodipine Year supply of refills good until 3/2026  Alprazolam written with 2 refills 12/27/24 #30    Outpatient Medication Detail   Disp Refills Start End    ALPRAZOLAM 1 MG Oral Tab 30 tablet 2 12/27/2024 --    Sig - Route: TAKE 1 TABLET (1 MG TOTAL) BY MOUTH DAILY AS NEEDED (PANIC ATTACK). - Oral    Sent to pharmacy as: ALPRAZolam 1 MG Oral Tablet (Xanax)    Notes to Pharmacy: Not to exceed 3 additional fills before 03/04/2025 DX Code Needed  .    E-Prescribing Status: Receipt confirmed by pharmacy (12/27/2024  1:48 PM CST)    Associated Diagnoses  Generalized anxiety disorder with panic attacks      Pharmacy  [id number] [place] [facility] [address] AT [facility], [phone], [phone]       Outpatient Medication Detail   Disp Refills Start End    amLODIPine 10 MG Oral Tab 90 tablet 9 1/9/2025 --    Sig - Route: Take 1 tablet (10 mg total) by mouth daily. FOR BLOOD PRESSURE. - Oral    Sent to pharmacy as: amLODIPine Besylate 10 MG Oral Tablet (Norvasc)    Notes to Pharmacy: Generic.    E-Prescribing Status: Receipt confirmed by pharmacy (1/9/2025  8:31 AM CST)    Associated Diagnoses  Hypertension goal BP (blood pressure) < 130/80      Pharmacy  [id number]  [place], [place] - [address] AT [facility], [phone], [phone]

## 2025-02-01 ENCOUNTER — HOSPITAL ENCOUNTER (OUTPATIENT)
Dept: GENERAL RADIOLOGY | Age: 40
Discharge: HOME | End: 2025-02-01
Attending: INTERNAL MEDICINE
Payer: COMMERCIAL

## 2025-02-01 DIAGNOSIS — M48.062 NEUROGENIC CLAUDICATION DUE TO LUMBAR SPINAL STENOSIS: ICD-10-CM

## 2025-02-01 DIAGNOSIS — M54.12 CERVICAL RADICULITIS: ICD-10-CM

## 2025-02-01 PROCEDURE — 72070 X-RAY EXAM THORAC SPINE 2VWS: CPT | Performed by: INTERNAL MEDICINE

## 2025-02-01 PROCEDURE — 72100 X-RAY EXAM L-S SPINE 2/3 VWS: CPT | Performed by: INTERNAL MEDICINE

## 2025-02-01 PROCEDURE — 72040 X-RAY EXAM NECK SPINE 2-3 VW: CPT | Performed by: INTERNAL MEDICINE

## 2025-02-01 PROCEDURE — 72072 X-RAY EXAM THORAC SPINE 3VWS: CPT | Performed by: INTERNAL MEDICINE

## 2025-02-10 ENCOUNTER — TELEMEDICINE (OUTPATIENT)
Facility: LOCATION | Age: 40
End: 2025-02-10
Payer: COMMERCIAL

## 2025-02-10 DIAGNOSIS — M54.42 CHRONIC BILATERAL LOW BACK PAIN WITH BILATERAL SCIATICA: Primary | ICD-10-CM

## 2025-02-10 DIAGNOSIS — M54.41 CHRONIC BILATERAL LOW BACK PAIN WITH BILATERAL SCIATICA: Primary | ICD-10-CM

## 2025-02-10 DIAGNOSIS — G89.29 CHRONIC BILATERAL LOW BACK PAIN WITH BILATERAL SCIATICA: Primary | ICD-10-CM

## 2025-02-10 NOTE — PROGRESS NOTES
INTERNAL MEDICINE VIDEO VISIT NOTE     Patient ID: Gibran Ramirez III is a 39 year old male.  Today's Date: 02/10/25  HPI  Pleasant 39-year-old gentleman who presents for neck and back pain.  We performed an x-ray which shows lumbar and cervical spine overall unremarkable but he has disc disease in the thoracic spine.  Does do a lot of construction and lifting.  States has been going on for many years.  We started him on Lyrica however he was only taking this as needed, he noted that it made him feel tired he only had a little bit of relief.  We discussed the proper way to take Lyrica to get the most benefit.    There were no vitals filed for this visit.  body mass index is unknown because there is no height or weight on file.  BP Readings from Last 3 Encounters:   01/09/25 133/82   12/29/23 128/84   06/29/23 130/80     The 10-year ASCVD risk score (Anne MEYERS, et al., 2019) is: 2%    Values used to calculate the score:      Age: 39 years      Sex: Male      Is Non- : No      Diabetic: No      Tobacco smoker: Yes      Systolic Blood Pressure: 133 mmHg      Is BP treated: Yes      HDL Cholesterol: 60 mg/dL      Total Cholesterol: 140 mg/dL  Medications reviewed:  Current Outpatient Medications   Medication Sig Dispense Refill    aspirin (KP ASPIRIN) 81 MG Oral Tab EC Take 1 tablet (81 mg total) by mouth daily. FOR HEART. 90 tablet 9    amLODIPine 10 MG Oral Tab Take 1 tablet (10 mg total) by mouth daily. FOR BLOOD PRESSURE. 90 tablet 9    rosuvastatin 10 MG Oral Tab Take 1 tablet (10 mg total) by mouth nightly. FOR CHOLESTEROL. 90 tablet 9    telmisartan 80 MG Oral Tab Take 1 tablet (80 mg total) by mouth nightly. FOR BLOOD PRESSURE 90 tablet 9    escitalopram 20 MG Oral Tab Take 1 tablet (20 mg total) by mouth daily. FOR ANXIETY. 90 tablet 9    pregabalin (LYRICA) 50 MG Oral Cap Take 1 capsule (50 mg total) by mouth at bedtime. FOR NERVE PAIN. UPDATE ME VIA [employer] IN 2  WEEKS WITH RESPONSE; SEE ME OFFICE/VIDEO IN 4 WEEKS FOR REFILL. 90 capsule 0    ALPRAZOLAM 1 MG Oral Tab TAKE 1 TABLET (1 MG TOTAL) BY MOUTH DAILY AS NEEDED (PANIC ATTACK). 30 tablet 2         Assessment & Plan    1. Chronic bilateral low back pain with bilateral sciatica (Primary)  Plan  I have instructed him to take the Lyrica consistently for at least 2 weeks before we determine if this is a failure if he has to any side effects, he was religiously taking this as needed, he will update me in 2 weeks on his response we will plan to follow-up in 4 weeks to reevaluate/refill    Follow Up: Return in about 4 weeks (around 3/10/2025) for OFFICE OR VIDEO VISIT- lyrica/back pain..         Objective: Results:   Physical Exam  Nursing note reviewed.   Constitutional:       General: He is not in acute distress.     Appearance: Normal appearance.   HENT:      Head: Normocephalic and atraumatic.      Right Ear: External ear normal.      Left Ear: External ear normal.      Nose: Nose normal.   Eyes:      Conjunctiva/sclera: Conjunctivae normal.   Pulmonary:      Effort: Pulmonary effort is normal. No respiratory distress.   Musculoskeletal:      Cervical back: Normal range of motion and neck supple.   Skin:     Coloration: Skin is not jaundiced.   Neurological:      General: No focal deficit present.      Mental Status: He is alert and oriented to person, place, and time. Mental status is at baseline.   Psychiatric:         Mood and Affect: Mood normal.         Thought Content: Thought content normal.        Reviewed:  Patient Active Problem List    Diagnosis    Coronary artery disease    Elevated coronary artery calcium score    Moderate asthma (HCC)    Class 2 obesity due to excess calories without serious comorbidity with body mass index (BMI) of 36.0 to 36.9 in adult    Chronic bilateral low back pain without sciatica    Generalized anxiety disorder with panic attacks     6/15/2021: Doing well on Lexapro, increased from 10  mg to 20 mg, follow-up 1 month for evaluation.      Hypertension goal BP (blood pressure) < 130/80     6/15/2021: Significantly improved on amlodipine 10 mg, chlorthalidone 12.5 mg, telmisartan 80 mg, continue with current.      Smoking      Allergies[1]     Social History     Socioeconomic History    Marital status: Single   Tobacco Use    Smoking status: Every Day     Current packs/day: 0.50     Average packs/day: 0.5 packs/day for 20.0 years (10.0 ttl pk-yrs)     Types: Cigarettes     Passive exposure: Current    Smokeless tobacco: Never   Vaping Use    Vaping status: Never Used   Substance and Sexual Activity    Alcohol use: Yes     Comment: rarely    Drug use: Yes     Frequency: 4.0 times per week     Types: Cannabis     Comment: every other day     Social Drivers of Health      Received from mention, mention    [facility]      Review of Systems  All other systems negative unless otherwise stated in ROS or HPI above.       Satnam Lopez MD  Internal Medicine       Call office with any questions or seek emergency care if necessary.   Patient understands and agrees to follow directions and advice.    Telehealth Verbal Consent   I conducted a telehealth visit with Gibran Ramirez III today, 02/10/25, which was completed using two-way, real-time interactive audio and video communication. This has been done in good michela to provide continuity of care in the best interest of the provider-patient relationship, due to the COVID -19 public health crisis/national emergency where restrictions of face-to-face office visits are ongoing. Every conscious effort was taken to allow for sufficient and adequate time to complete the visit.The patient was made aware of the limitations of the telehealth visit, including treatment limitations as no physical exam could be performed.  The patient was advised to call 911 or to go to the ER in case there was an emergency.  The patient was also advised of the potential  privacy & security concerns related to the telehealth platform. The patient was made aware of where to find [facility]'s notice of privacy practices, telehealth consent form and other related consent forms and documents.  which are located on the [facility] website. The patient verbally agreed to telehealth consent form, related consents and the risks discussed.  Lastly, the patient confirmed that they were in [place]. Included in this visit, time may have been spent reviewing labs, medications, radiology tests and decision making. Appropriate medical decision-making and tests are ordered as detailed in the plan of care above.  Coding/billing information is submitted for this visit based on complexity of care and/or time spent for the visit.  ----------------------------------------- PATIENT INSTRUCTIONS-----------------------------------------     There are no Patient Instructions on file for this visit.           [1]   Allergies  Allergen Reactions    Chlorthalidone OTHER (SEE COMMENTS)     Hyponatremia, make sodium go too low

## 2025-03-21 DIAGNOSIS — F41.1 GENERALIZED ANXIETY DISORDER WITH PANIC ATTACKS: ICD-10-CM

## 2025-03-21 DIAGNOSIS — F41.0 GENERALIZED ANXIETY DISORDER WITH PANIC ATTACKS: ICD-10-CM

## 2025-03-21 NOTE — TELEPHONE ENCOUNTER
Patient called to request a refill on below medication.    Declined to schedule an appointment, advised he will schedule later.     [facility]     Patient states he is out of medication and states he \"needs\" it     Medication Quantity Refills Start End   ALPRAZOLAM 1 MG Oral Tab 30 tablet 2 12/27/2024 --   Sig:   TAKE 1 TABLET (1 MG TOTAL) BY MOUTH DAILY AS NEEDED (PANIC ATTACK).     Route:   Oral     Note to Pharmacy:   Not to exceed 3 additional fills before 03/04/2025 DX Code Needed  .     PRN Comment:   panic attack     Order #:   [id number]

## 2025-03-21 NOTE — TELEPHONE ENCOUNTER
Recent Fills: 12/27/2024, 01/23/2025, 02/19/2025    Last Rx Written: 12/27/2024    Last Office Visit: Telemedicine 02/10/2025    Patient was offered to schedule an appointment-patient declined-advise he will schedule later

## 2025-03-23 RX ORDER — ALPRAZOLAM 1 MG/1
1 TABLET ORAL DAILY PRN
Qty: 30 TABLET | Refills: 2 | OUTPATIENT
Start: 2025-03-23

## 2025-03-25 ENCOUNTER — TELEPHONE (OUTPATIENT)
Facility: LOCATION | Age: 40
End: 2025-03-25

## 2025-03-25 ENCOUNTER — PATIENT OUTREACH (OUTPATIENT)
Dept: CASE MANAGEMENT | Age: 40
End: 2025-03-25

## 2025-03-25 ENCOUNTER — OFFICE VISIT (OUTPATIENT)
Facility: LOCATION | Age: 40
End: 2025-03-25
Payer: COMMERCIAL

## 2025-03-25 VITALS
HEART RATE: 80 BPM | BODY MASS INDEX: 37.76 KG/M2 | SYSTOLIC BLOOD PRESSURE: 124 MMHG | DIASTOLIC BLOOD PRESSURE: 75 MMHG | HEIGHT: 68.5 IN | WEIGHT: 252 LBS

## 2025-03-25 DIAGNOSIS — I25.118 CORONARY ARTERY DISEASE INVOLVING NATIVE CORONARY ARTERY OF NATIVE HEART WITH OTHER FORM OF ANGINA PECTORIS: Chronic | ICD-10-CM

## 2025-03-25 DIAGNOSIS — F41.1 GENERALIZED ANXIETY DISORDER WITH PANIC ATTACKS: Chronic | ICD-10-CM

## 2025-03-25 DIAGNOSIS — M47.814 SPONDYLOSIS OF THORACIC REGION WITHOUT MYELOPATHY OR RADICULOPATHY: Primary | Chronic | ICD-10-CM

## 2025-03-25 DIAGNOSIS — I10 HYPERTENSION GOAL BP (BLOOD PRESSURE) < 130/80: Chronic | ICD-10-CM

## 2025-03-25 DIAGNOSIS — F41.0 GENERALIZED ANXIETY DISORDER WITH PANIC ATTACKS: Chronic | ICD-10-CM

## 2025-03-25 PROBLEM — M54.50 CHRONIC BILATERAL LOW BACK PAIN WITHOUT SCIATICA: Status: RESOLVED | Noted: 2021-07-20 | Resolved: 2025-03-25

## 2025-03-25 PROBLEM — G89.29 CHRONIC BILATERAL LOW BACK PAIN WITHOUT SCIATICA: Status: RESOLVED | Noted: 2021-07-20 | Resolved: 2025-03-25

## 2025-03-25 PROCEDURE — 99215 OFFICE O/P EST HI 40 MIN: CPT | Performed by: STUDENT IN AN ORGANIZED HEALTH CARE EDUCATION/TRAINING PROGRAM

## 2025-03-25 RX ORDER — AMLODIPINE BESYLATE 10 MG/1
10 TABLET ORAL DAILY
Qty: 90 TABLET | Refills: 3 | Status: SHIPPED | OUTPATIENT
Start: 2025-03-25

## 2025-03-25 RX ORDER — ASPIRIN 81 MG/1
81 TABLET ORAL DAILY
Qty: 90 TABLET | Refills: 9 | Status: SHIPPED | OUTPATIENT
Start: 2025-03-25

## 2025-03-25 RX ORDER — ALPRAZOLAM 1 MG/1
1 TABLET ORAL DAILY PRN
Qty: 30 TABLET | Refills: 2 | Status: SHIPPED | OUTPATIENT
Start: 2025-03-25

## 2025-03-25 RX ORDER — ESCITALOPRAM OXALATE 20 MG/1
20 TABLET ORAL DAILY
Qty: 90 TABLET | Refills: 3 | Status: SHIPPED | OUTPATIENT
Start: 2025-03-25

## 2025-03-25 RX ORDER — ROSUVASTATIN CALCIUM 10 MG/1
10 TABLET, COATED ORAL NIGHTLY
Qty: 90 TABLET | Refills: 3 | Status: SHIPPED | OUTPATIENT
Start: 2025-03-25

## 2025-03-25 RX ORDER — TELMISARTAN 80 MG/1
80 TABLET ORAL NIGHTLY
Qty: 90 TABLET | Refills: 3 | Status: SHIPPED | OUTPATIENT
Start: 2025-03-25

## 2025-03-25 NOTE — PROGRESS NOTES
OFFICE NOTE       The following individual(s) verbally consented to be recorded using ambient AI listening technology and understand that they can each withdraw their consent to this listening technology at any point by asking the clinician to turn off or pause the recording:    Patient name: Gibran Ramirez III  Additional names:            Patient ID: Gibran Ramirez III is a 39 year old male.  Today's Date: 03/25/25  Chief Complaint: Establish Care      History of Present Illness  Gibran Ramirez III is a 39 year old male with hypertension, hyperlipidemia, and anxiety who presents for medication refills and management of chronic back pain.    He is seeking a refill for alprazolam, which he uses for anxiety and panic attacks. He has been on Lexapro for a few years, taking one tablet daily in the morning, which has significantly helped his anxiety. He uses alprazolam 1 mg as needed for acute anxiety episodes.    He experiences chronic lower back pain with sciatica, which sometimes worsens at night or with overexertion. The pain is localized to the back and does not radiate. Spinal x-rays have revealed moderate degenerative changes in the thoracic spine. He has tried medications like gabapentin and Lyrica in the past but is currently not taking any medication for back pain. He occasionally uses ibuprofen for relief.    He has a history of hypertension, initially presenting with a blood pressure of 189/100 mmHg. He is currently on amlodipine 10 mg and telmisartan 80 mg for blood pressure management.    He has hyperlipidemia and is on rosuvastatin 10 mg. A calcium CT scan in December 2023 showed mild plaque buildup, leading to the initiation of rosuvastatin. He also takes aspirin, which is covered by insurance.    He has a history of being overweight, previously weighing 330 pounds a year and a half ago. He is currently working on lifestyle changes to manage his weight and improve  his health.    He works as a , which involves prolonged sitting and contributes to his back pain. He has a seven-year-old child and is motivated to make lifestyle changes for his family's well-being.       Vitals:    03/25/25 0814   BP: 124/75   Pulse: 80   Weight: 252 lb (114.3 kg)   Height: 5' 8.5\" (1.74 m)     body mass index is 37.76 kg/m².  BP Readings from Last 3 Encounters:   03/25/25 124/75   01/09/25 133/82   12/29/23 128/84     The 10-year ASCVD risk score (Anne MEYERS, et al., 2019) is: 1.7%    Values used to calculate the score:      Age: 39 years      Sex: Male      Is Non- : No      Diabetic: No      Tobacco smoker: Yes      Systolic Blood Pressure: 124 mmHg      Is BP treated: Yes      HDL Cholesterol: 60 mg/dL      Total Cholesterol: 140 mg/dL  Results  RADIOLOGY  Thoracic spine X-ray: Moderate degenerative changes  Calcium CT scan: Mild plaque buildup (12/2023)       Medications reviewed:  Current Outpatient Medications   Medication Sig Dispense Refill    escitalopram 20 MG Oral Tab Take 1 tablet (20 mg total) by mouth daily. FOR ANXIETY. 90 tablet 3    ALPRAZolam 1 MG Oral Tab Take 1 tablet (1 mg total) by mouth daily as needed (panic attack). 30 tablet 2    amLODIPine 10 MG Oral Tab Take 1 tablet (10 mg total) by mouth daily. FOR BLOOD PRESSURE. 90 tablet 3    telmisartan 80 MG Oral Tab Take 1 tablet (80 mg total) by mouth nightly. FOR BLOOD PRESSURE 90 tablet 3    rosuvastatin 10 MG Oral Tab Take 1 tablet (10 mg total) by mouth nightly. FOR CHOLESTEROL. 90 tablet 3    aspirin (KP ASPIRIN) 81 MG Oral Tab EC Take 1 tablet (81 mg total) by mouth daily. FOR HEART. 90 tablet 9         Assessment & Plan    1. Spondylosis of thoracic region without myelopathy or radiculopathy (Primary)  2. Generalized anxiety disorder with panic attacks  Overview:  6/15/2021: Doing well on Lexapro, increased from 10 mg to 20 mg, follow-up 1 month for evaluation.  Orders:  -      Escitalopram Oxalate; Take 1 tablet (20 mg total) by mouth daily. FOR ANXIETY.  Dispense: 90 tablet; Refill: 3  -     ALPRAZolam; Take 1 tablet (1 mg total) by mouth daily as needed (panic attack).  Dispense: 30 tablet; Refill: 2  3. Hypertension goal BP (blood pressure) < 130/80  Overview:  6/15/2021: Significantly improved on amlodipine 10 mg, chlorthalidone 12.5 mg, telmisartan 80 mg, continue with current.  Orders:  -     amLODIPine Besylate; Take 1 tablet (10 mg total) by mouth daily. FOR BLOOD PRESSURE.  Dispense: 90 tablet; Refill: 3  -     Telmisartan; Take 1 tablet (80 mg total) by mouth nightly. FOR BLOOD PRESSURE  Dispense: 90 tablet; Refill: 3  4. Coronary artery disease involving native coronary artery of native heart with other form of angina pectoris  -     Rosuvastatin Calcium; Take 1 tablet (10 mg total) by mouth nightly. FOR CHOLESTEROL.  Dispense: 90 tablet; Refill: 3  -     Aspirin; Take 1 tablet (81 mg total) by mouth daily. FOR HEART.  Dispense: 90 tablet; Refill: 9    Assessment & Plan  Generalized Anxiety Disorder with Panic Attacks  Managed with alprazolam and escitalopram. Discussed long-term risks of alprazolam, including dementia. He prefers escitalopram. Discussed duloxetine, but he prefers current regimen.  - Prescribe alprazolam 1 mg as needed for anxiety.  - Continue escitalopram 20 mg daily.  - Schedule follow-up every three months for alprazolam refill and monitoring.    Hypertension  Blood pressure stabilized with amlodipine and telmisartan. Emphasized lifestyle modifications for further control.  - Continue amlodipine 10 mg daily.  - Continue telmisartan 80 mg daily.  - Encourage lifestyle modifications including weight loss and regular exercise.    Hyperlipidemia  Managed with rosuvastatin. Mild plaque buildup noted on calcium CT scan. Discussed lifestyle changes to complement medication.  - Continue rosuvastatin 10 mg daily.  - Encourage dietary modifications to reduce  cholesterol intake.  - Promote regular physical activity.    Chronic Lower Back Pain with Degenerative Changes  Chronic pain with moderate degenerative changes. Prefers non-pharmacological management. Declined physical therapy. Discussed posture and ergonomic adjustments.  - Recommend home-based stretching exercises and physical therapy via online resources.  - Advise on proper posture and ergonomic adjustments at work.  - Encourage weight loss to alleviate back pain.       Follow Up: As needed/if symptoms worsen or Return in about 3 months (around 6/23/2025) for refill on alprazolam..         Objective/ Results:   Physical Exam  Constitutional:       Appearance: He is well-developed.   Cardiovascular:      Rate and Rhythm: Normal rate and regular rhythm.      Heart sounds: Normal heart sounds.   Pulmonary:      Effort: Pulmonary effort is normal.      Breath sounds: Normal breath sounds.   Abdominal:      General: Bowel sounds are normal.      Palpations: Abdomen is soft.   Musculoskeletal:         General: Tenderness (thoracic back pain) present.   Skin:     General: Skin is warm and dry.   Neurological:      Mental Status: He is alert and oriented to person, place, and time.      Deep Tendon Reflexes: Reflexes are normal and symmetric.        Physical Exam  MEASUREMENTS: Height- 5'9\", Weight- 152.     Reviewed:    Patient Active Problem List    Diagnosis    Spondylosis of thoracic region without myelopathy or radiculopathy    Coronary artery disease    Elevated coronary artery calcium score    Moderate asthma (HCC)    Class 2 obesity due to excess calories without serious comorbidity with body mass index (BMI) of 36.0 to 36.9 in adult    Chronic bilateral low back pain without sciatica    Generalized anxiety disorder with panic attacks     6/15/2021: Doing well on Lexapro, increased from 10 mg to 20 mg, follow-up 1 month for evaluation.      Hypertension goal BP (blood pressure) < 130/80     6/15/2021:  Significantly improved on amlodipine 10 mg, chlorthalidone 12.5 mg, telmisartan 80 mg, continue with current.      Smoking      Allergies[1]     Social History     Socioeconomic History    Marital status: Single   Tobacco Use    Smoking status: Every Day     Current packs/day: 1.00     Average packs/day: 1 pack/day for 20.0 years (20.0 ttl pk-yrs)     Types: Cigarettes     Passive exposure: Current    Smokeless tobacco: Never   Vaping Use    Vaping status: Never Used   Substance and Sexual Activity    Alcohol use: Yes     Comment: rarely    Drug use: Yes     Frequency: 4.0 times per week     Types: Cannabis     Comment: every other day     Social Drivers of Health      Received from [employer], [employer]    [facility]      Review of Systems   Constitutional: Negative.    Respiratory: Negative.     Cardiovascular: Negative.    Gastrointestinal: Negative.    Genitourinary: Negative.    Musculoskeletal:  Positive for back pain.       All other systems negative unless otherwise stated in ROS or HPI above.       Randall Melvin MD  Internal Medicine       Call office with any questions or seek emergency care if necessary.   Patient understands and agrees to follow directions and advice.      ----------------------------------------- PATIENT INSTRUCTIONS-----------------------------------------     Patient Instructions     Lifestyle Changes Recommendations:   Nutritional Goals Reviewed and Discussed:   Limit Carbohydrates to 100gm per day, Eat 100-200 calories within 1 hour of awkaing up, Eat 3-4 cups of fresh fruits or vegetables daily    Behavior Modification Reviewed and Discussed:  Eat breakfast, Eat 3 meals per day, Plan meals in advance (if unable to cook, meal prep service such as [employer]) High protein, Low simple carbs. Read nutrition labels track calories and Macros with [employer] or [employer] polo (thus daily food journal), No drinking 30mintutes before or after meals, utilize portion control strategies to  reduce caloric intake, Identify triggers for eating and manage cues, and Eat Slowly and take 20-30 minutes to complete each meal.    Goal for Next Month:  Keep Food log: At first track current daily caloric intake and limit current caloric consumption by 500 to 1,000 calories daily OR start with caloric restriction of less than 1,800 calories daily.   Increase Cardiac/cardio exercise at least 30minutes a day/ 180 minutes a week, ideal Goal is 1hr a day (5 days a week) would prefer if enrolls in fitness classes or  at least 1x a week. (If possible to work out in the morning is proven to increase natural Dopamine, Serotonin, Endorphin, levels (Feel good and energy hormones) and reduce cortisol  (stress hormone levels).   Drink 48-64 ounces of non-caloric beverages per day. No fruit juices or regular soda.  Increase activity- upper body exercises in addition to cardio and, aim to walk 10minutes per day after dinner  Increase fruit and vegetable servings to 5-6 per day.   6. Food recommendation for Breakfast: Steel cut oatmeal:  1cup Steel cut oatmeal and 2cups unsweetened almond milk. And cinnamon (let it ,overnight in a bowl), and portion out 4 servings (separate containers/jars), then daily add one triple zero okios greek yogurt, 1 medium banana and however many berries your heart desires  All berries are good, (blueberry, raspberry, strawberries, blackberries).   -avoid high sugar fruits (pineapple, mangos, melons, banana)- high  7. Plant based protein: Ascent Plant Based Protein Powder - Non Dairy Vegan Protein, Zero Artificial Ingredients, Soy & Gluten Free, No Added Sugar, 4g BCAA, 2g Leucine - Chocolate, 18 Servings          The 21st Century Cures Act makes medical notes available to patients in the interest of transparency.  However, please be advised that this is a medical document.  It is intended as a peer to peer communication.  It is written in medical language and may contain abbreviations  or verbiage that are technical and unfamiliar.  It may appear blunt or direct.  Medical documents are intended to carry relevant information, facts as evident, and the clinical opinion of the practitioner.          [1]   Allergies  Allergen Reactions    Chlorthalidone OTHER (SEE COMMENTS)     Hyponatremia, make sodium go too low

## 2025-03-25 NOTE — PATIENT INSTRUCTIONS
Lifestyle Changes Recommendations:   Nutritional Goals Reviewed and Discussed:   Limit Carbohydrates to 100gm per day, Eat 100-200 calories within 1 hour of awkaing up, Eat 3-4 cups of fresh fruits or vegetables daily    Behavior Modification Reviewed and Discussed:  Eat breakfast, Eat 3 meals per day, Plan meals in advance (if unable to cook, meal prep service such as [id number]) High protein, Low simple carbs. Read nutrition labels track calories and Macros with [employer] or [employer] polo (thus daily food journal), No drinking 30mintutes before or after meals, utilize portion control strategies to reduce caloric intake, Identify triggers for eating and manage cues, and Eat Slowly and take 20-30 minutes to complete each meal.    Goal for Next Month:  Keep Food log: At first track current daily caloric intake and limit current caloric consumption by 500 to 1,000 calories daily OR start with caloric restriction of less than 1,800 calories daily.   Increase Cardiac/cardio exercise at least 30minutes a day/ 180 minutes a week, ideal Goal is 1hr a day (5 days a week) would prefer if enrolls in fitness classes or  at least 1x a week. (If possible to work out in the morning is proven to increase natural Dopamine, Serotonin, Endorphin, levels (Feel good and energy hormones) and reduce cortisol  (stress hormone levels).   Drink 48-64 ounces of non-caloric beverages per day. No fruit juices or regular soda.  Increase activity- upper body exercises in addition to cardio and, aim to walk 10minutes per day after dinner  Increase fruit and vegetable servings to 5-6 per day.   6. Food recommendation for Breakfast: Steel cut oatmeal:  1cup Steel cut oatmeal and 2cups unsweetened almond milk. And cinnamon (let it ,overnight in a bowl), and portion out 4 servings (separate containers/jars), then daily add one triple zero okios greek yogurt, 1 medium banana and however many berries your heart desires  All berries are  good, (blueberry, raspberry, strawberries, blackberries).   -avoid high sugar fruits (pineapple, mangos, melons, banana)- high  7. Plant based protein: Ascent Plant Based Protein Powder - Non Dairy Vegan Protein, Zero Artificial Ingredients, Soy & Gluten Free, No Added Sugar, 4g BCAA, 2g Leucine - Chocolate, 18 Servings

## 2025-03-25 NOTE — TELEPHONE ENCOUNTER
Spoke with pt today in office. Name and  verified  Transferring care to Dr Randall Melvin  Order placed for pcp/registry removal and change

## 2025-03-25 NOTE — PROCEDURES
Received order requesting to update Primary Care Physician (PCP) to Dr. Randall Melvin is Approved and finalized on March 25, 2025.    Removed Satnam Lopez MD as the patient's Primary Care Physician

## 2025-06-16 DIAGNOSIS — F41.0 GENERALIZED ANXIETY DISORDER WITH PANIC ATTACKS: Chronic | ICD-10-CM

## 2025-06-16 DIAGNOSIS — F41.1 GENERALIZED ANXIETY DISORDER WITH PANIC ATTACKS: Chronic | ICD-10-CM

## 2025-06-18 RX ORDER — ALPRAZOLAM 1 MG/1
1 TABLET ORAL DAILY PRN
Qty: 30 TABLET | Refills: 2 | OUTPATIENT
Start: 2025-06-18

## 2025-08-04 ENCOUNTER — TELEPHONE (OUTPATIENT)
Dept: INTERNAL MEDICINE CLINIC | Facility: CLINIC | Age: 40
End: 2025-08-04

## 2025-08-04 DIAGNOSIS — I25.10 CORONARY ARTERY DISEASE DUE TO LIPID RICH PLAQUE: Primary | ICD-10-CM

## 2025-08-04 DIAGNOSIS — I25.83 CORONARY ARTERY DISEASE DUE TO LIPID RICH PLAQUE: Primary | ICD-10-CM

## 2025-08-15 RX ORDER — ROSUVASTATIN CALCIUM 20 MG/1
20 TABLET, COATED ORAL NIGHTLY
Qty: 90 TABLET | Refills: 3 | Status: SHIPPED | OUTPATIENT
Start: 2025-08-15

## (undated) DIAGNOSIS — F41.1 GENERALIZED ANXIETY DISORDER WITH PANIC ATTACKS: ICD-10-CM

## (undated) DIAGNOSIS — F41.0 GENERALIZED ANXIETY DISORDER WITH PANIC ATTACKS: ICD-10-CM

## (undated) DIAGNOSIS — M54.50 CHRONIC BILATERAL LOW BACK PAIN WITHOUT SCIATICA: ICD-10-CM

## (undated) DIAGNOSIS — I10 HYPERTENSION GOAL BP (BLOOD PRESSURE) < 130/80: ICD-10-CM

## (undated) DIAGNOSIS — G89.29 CHRONIC BILATERAL LOW BACK PAIN WITHOUT SCIATICA: ICD-10-CM

## (undated) NOTE — LETTER
Date & Time: 3/25/2023, 10:14 AM  Patient: Ramos Benito III  Encounter Provider(s):    ROXANN Wilkerson       To Whom It May Concern:    Josias Gonzáles was seen and treated in our department on 3/25/2023. He should not return to work until 3/28/23.     If you have any questions or concerns, please do not hesitate to call.        _____________________________  Physician/APC Signature

## (undated) NOTE — LETTER
[facility] [address] [facility] [address]  [phone]     Patient: Cira Mills III   YOB: 1985   Date of Visit: 11/13/2020     Dear Employer,        November 13, 2020    At [facility], we are taking Persons infected with SARS-CoV-2 who never develop COVID-19 symptoms may discontinue isolation and other precautions 10 days after the date of their first positive RT-PCR test for SARS-CoV-2 RNA.     Persons who are asymptomatic but have been exposed, CDC r